# Patient Record
Sex: FEMALE | Race: WHITE | Employment: FULL TIME | URBAN - METROPOLITAN AREA
[De-identification: names, ages, dates, MRNs, and addresses within clinical notes are randomized per-mention and may not be internally consistent; named-entity substitution may affect disease eponyms.]

---

## 2019-09-24 PROBLEM — Z34.90 PREGNANCY: Status: ACTIVE | Noted: 2019-09-24

## 2020-04-14 ENCOUNTER — HOSPITAL ENCOUNTER (INPATIENT)
Age: 30
LOS: 2 days | Discharge: HOME | End: 2020-04-17
Attending: OBSTETRICS & GYNECOLOGY | Admitting: OBSTETRICS & GYNECOLOGY
Payer: COMMERCIAL

## 2020-04-14 DIAGNOSIS — Z37.9 NORMAL LABOR: Primary | ICD-10-CM

## 2020-04-15 ENCOUNTER — ANESTHESIA EVENT (OUTPATIENT)
Dept: LABOR AND DELIVERY | Age: 30
End: 2020-04-15
Payer: COMMERCIAL

## 2020-04-15 ENCOUNTER — ANESTHESIA (OUTPATIENT)
Dept: LABOR AND DELIVERY | Age: 30
End: 2020-04-15
Payer: COMMERCIAL

## 2020-04-15 PROBLEM — Z37.9 NORMAL LABOR: Status: ACTIVE | Noted: 2020-04-15

## 2020-04-15 LAB
ABO + RH BLD: NORMAL
ALBUMIN SERPL-MCNC: 2.7 G/DL (ref 3.5–5)
ALBUMIN/GLOB SERPL: 0.7 {RATIO} (ref 1.2–3.5)
ALP SERPL-CCNC: 146 U/L (ref 50–136)
ALT SERPL-CCNC: 14 U/L (ref 12–65)
ANION GAP SERPL CALC-SCNC: 11 MMOL/L (ref 7–16)
ARTERIAL PATENCY WRIST A: ABNORMAL
ARTERIAL PATENCY WRIST A: ABNORMAL
AST SERPL-CCNC: 20 U/L (ref 15–37)
BASE DEFICIT BLD-SCNC: 5 MMOL/L
BASOPHILS # BLD: 0 K/UL (ref 0–0.2)
BASOPHILS NFR BLD: 0 % (ref 0–2)
BDY SITE: ABNORMAL
BDY SITE: ABNORMAL
BILIRUB SERPL-MCNC: 0.1 MG/DL (ref 0.2–1.1)
BLOOD GROUP ANTIBODIES SERPL: NORMAL
BUN SERPL-MCNC: 7 MG/DL (ref 6–23)
CA-I BLD-MCNC: 1.54 MMOL/L (ref 1.12–1.32)
CALCIUM SERPL-MCNC: 8.9 MG/DL (ref 8.3–10.4)
CHLORIDE SERPL-SCNC: 107 MMOL/L (ref 98–107)
CO2 SERPL-SCNC: 20 MMOL/L (ref 21–32)
COLLECT TIME,HTIME: 1335
COLLECT TIME,HTIME: 1335
CREAT SERPL-MCNC: 0.63 MG/DL (ref 0.6–1)
DIFFERENTIAL METHOD BLD: ABNORMAL
EOSINOPHIL # BLD: 0 K/UL (ref 0–0.8)
EOSINOPHIL NFR BLD: 0 % (ref 0.5–7.8)
ERYTHROCYTE [DISTWIDTH] IN BLOOD BY AUTOMATED COUNT: 12.8 % (ref 11.9–14.6)
GAS FLOW.O2 O2 DELIVERY SYS: ABNORMAL L/MIN
GAS FLOW.O2 O2 DELIVERY SYS: ABNORMAL L/MIN
GLOBULIN SER CALC-MCNC: 4 G/DL (ref 2.3–3.5)
GLUCOSE BLD STRIP.AUTO-MCNC: 76 MG/DL (ref 65–100)
GLUCOSE BLD STRIP.AUTO-MCNC: 93 MG/DL (ref 65–100)
GLUCOSE SERPL-MCNC: 93 MG/DL (ref 65–100)
HCO3 BLD-SCNC: 23.2 MMOL/L (ref 22–26)
HCO3 BLDV-SCNC: 21 MMOL/L (ref 23–28)
HCT VFR BLD AUTO: 36.6 % (ref 35.8–46.3)
HGB BLD-MCNC: 12.5 G/DL (ref 11.7–15.4)
IMM GRANULOCYTES # BLD AUTO: 0 K/UL (ref 0–0.5)
IMM GRANULOCYTES NFR BLD AUTO: 0 % (ref 0–5)
LYMPHOCYTES # BLD: 2.6 K/UL (ref 0.5–4.6)
LYMPHOCYTES NFR BLD: 24 % (ref 13–44)
MCH RBC QN AUTO: 30.3 PG (ref 26.1–32.9)
MCHC RBC AUTO-ENTMCNC: 34.2 G/DL (ref 31.4–35)
MCV RBC AUTO: 88.8 FL (ref 79.6–97.8)
MONOCYTES # BLD: 1 K/UL (ref 0.1–1.3)
MONOCYTES NFR BLD: 9 % (ref 4–12)
NEUTS SEG # BLD: 7.3 K/UL (ref 1.7–8.2)
NEUTS SEG NFR BLD: 67 % (ref 43–78)
NRBC # BLD: 0 K/UL (ref 0–0.2)
PCO2 BLD: 54.7 MMHG (ref 35–45)
PCO2 BLDV: 39.6 MMHG (ref 41–51)
PH BLD: 7.24 [PH] (ref 7.35–7.45)
PH BLDV: 7.33 [PH] (ref 7.32–7.42)
PLATELET # BLD AUTO: 229 K/UL (ref 150–450)
PMV BLD AUTO: 12.4 FL (ref 9.4–12.3)
PO2 BLD: 21 MMHG (ref 75–100)
PO2 BLDV: 27 MMHG
POTASSIUM BLD-SCNC: 4.8 MMOL/L (ref 3.5–5.1)
POTASSIUM SERPL-SCNC: 3.7 MMOL/L (ref 3.5–5.1)
PROT SERPL-MCNC: 6.7 G/DL (ref 6.3–8.2)
RBC # BLD AUTO: 4.12 M/UL (ref 4.05–5.2)
SAO2 % BLD: 26 % (ref 95–98)
SAO2 % BLDV: 47 % (ref 65–88)
SERVICE CMNT-IMP: ABNORMAL
SERVICE CMNT-IMP: ABNORMAL
SODIUM BLD-SCNC: 135 MMOL/L (ref 136–145)
SODIUM SERPL-SCNC: 138 MMOL/L (ref 136–145)
SPECIMEN EXP DATE BLD: NORMAL
SPECIMEN TYPE: ABNORMAL
SPECIMEN TYPE: ABNORMAL
WBC # BLD AUTO: 10.9 K/UL (ref 4.3–11.1)

## 2020-04-15 PROCEDURE — 74011250637 HC RX REV CODE- 250/637: Performed by: OBSTETRICS & GYNECOLOGY

## 2020-04-15 PROCEDURE — 82947 ASSAY GLUCOSE BLOOD QUANT: CPT

## 2020-04-15 PROCEDURE — 59025 FETAL NON-STRESS TEST: CPT

## 2020-04-15 PROCEDURE — 4A1HXCZ MONITORING OF PRODUCTS OF CONCEPTION, CARDIAC RATE, EXTERNAL APPROACH: ICD-10-PCS | Performed by: OBSTETRICS & GYNECOLOGY

## 2020-04-15 PROCEDURE — 77030019905 HC CATH URETH INTMIT MDII -A

## 2020-04-15 PROCEDURE — 0KQM0ZZ REPAIR PERINEUM MUSCLE, OPEN APPROACH: ICD-10-PCS | Performed by: OBSTETRICS & GYNECOLOGY

## 2020-04-15 PROCEDURE — 82803 BLOOD GASES ANY COMBINATION: CPT

## 2020-04-15 PROCEDURE — 77030018846 HC SOL IRR STRL H20 ICUM -A: Performed by: OBSTETRICS & GYNECOLOGY

## 2020-04-15 PROCEDURE — 74011250636 HC RX REV CODE- 250/636: Performed by: ANESTHESIOLOGY

## 2020-04-15 PROCEDURE — 76060000078 HC EPIDURAL ANESTHESIA

## 2020-04-15 PROCEDURE — 74011000250 HC RX REV CODE- 250: Performed by: ANESTHESIOLOGY

## 2020-04-15 PROCEDURE — 36415 COLL VENOUS BLD VENIPUNCTURE: CPT

## 2020-04-15 PROCEDURE — 77030003028 HC SUT VCRL J&J -A: Performed by: OBSTETRICS & GYNECOLOGY

## 2020-04-15 PROCEDURE — 77030002888 HC SUT CHRMC J&J -A: Performed by: OBSTETRICS & GYNECOLOGY

## 2020-04-15 PROCEDURE — 75410000002 HC LABOR FEE PER 1 HR

## 2020-04-15 PROCEDURE — 65270000029 HC RM PRIVATE

## 2020-04-15 PROCEDURE — 00HU33Z INSERTION OF INFUSION DEVICE INTO SPINAL CANAL, PERCUTANEOUS APPROACH: ICD-10-PCS | Performed by: ANESTHESIOLOGY

## 2020-04-15 PROCEDURE — 80053 COMPREHEN METABOLIC PANEL: CPT

## 2020-04-15 PROCEDURE — A4300 CATH IMPL VASC ACCESS PORTAL: HCPCS | Performed by: ANESTHESIOLOGY

## 2020-04-15 PROCEDURE — 77030014125 HC TY EPDRL BBMI -B: Performed by: ANESTHESIOLOGY

## 2020-04-15 PROCEDURE — 74011250636 HC RX REV CODE- 250/636: Performed by: NURSE ANESTHETIST, CERTIFIED REGISTERED

## 2020-04-15 PROCEDURE — 86900 BLOOD TYPING SEROLOGIC ABO: CPT

## 2020-04-15 PROCEDURE — 74011250636 HC RX REV CODE- 250/636: Performed by: OBSTETRICS & GYNECOLOGY

## 2020-04-15 PROCEDURE — 77030011945 HC CATH URIN INT ST MENT -A: Performed by: OBSTETRICS & GYNECOLOGY

## 2020-04-15 PROCEDURE — 85025 COMPLETE CBC W/AUTO DIFF WBC: CPT

## 2020-04-15 PROCEDURE — 75410000003 HC RECOV DEL/VAG/CSECN EA 0.5 HR

## 2020-04-15 PROCEDURE — 75410000000 HC DELIVERY VAGINAL/SINGLE

## 2020-04-15 PROCEDURE — 99283 EMERGENCY DEPT VISIT LOW MDM: CPT

## 2020-04-15 RX ORDER — ONDANSETRON 2 MG/ML
4 INJECTION INTRAMUSCULAR; INTRAVENOUS
Status: DISCONTINUED | OUTPATIENT
Start: 2020-04-15 | End: 2020-04-15 | Stop reason: HOSPADM

## 2020-04-15 RX ORDER — DIPHENHYDRAMINE HCL 25 MG
25 CAPSULE ORAL
Status: DISCONTINUED | OUTPATIENT
Start: 2020-04-15 | End: 2020-04-17 | Stop reason: HOSPADM

## 2020-04-15 RX ORDER — CALCIUM CARBONATE 200(500)MG
200 TABLET,CHEWABLE ORAL AS NEEDED
Status: DISCONTINUED | OUTPATIENT
Start: 2020-04-15 | End: 2020-04-17 | Stop reason: HOSPADM

## 2020-04-15 RX ORDER — MINERAL OIL
120 OIL (ML) ORAL
Status: COMPLETED | OUTPATIENT
Start: 2020-04-15 | End: 2020-04-15

## 2020-04-15 RX ORDER — ROPIVACAINE HYDROCHLORIDE 2 MG/ML
INJECTION, SOLUTION EPIDURAL; INFILTRATION; PERINEURAL
Status: DISCONTINUED | OUTPATIENT
Start: 2020-04-15 | End: 2020-04-15 | Stop reason: HOSPADM

## 2020-04-15 RX ORDER — SODIUM CHLORIDE 0.9 % (FLUSH) 0.9 %
5-40 SYRINGE (ML) INJECTION EVERY 8 HOURS
Status: DISCONTINUED | OUTPATIENT
Start: 2020-04-15 | End: 2020-04-16

## 2020-04-15 RX ORDER — IBUPROFEN 800 MG/1
800 TABLET ORAL
Status: DISCONTINUED | OUTPATIENT
Start: 2020-04-15 | End: 2020-04-17 | Stop reason: HOSPADM

## 2020-04-15 RX ORDER — NALOXONE HYDROCHLORIDE 0.4 MG/ML
0.4 INJECTION, SOLUTION INTRAMUSCULAR; INTRAVENOUS; SUBCUTANEOUS AS NEEDED
Status: DISCONTINUED | OUTPATIENT
Start: 2020-04-15 | End: 2020-04-17 | Stop reason: HOSPADM

## 2020-04-15 RX ORDER — OXYTOCIN/RINGER'S LACTATE 30/500 ML
250 PLASTIC BAG, INJECTION (ML) INTRAVENOUS ONCE
Status: DISPENSED | OUTPATIENT
Start: 2020-04-15 | End: 2020-04-15

## 2020-04-15 RX ORDER — LIDOCAINE HYDROCHLORIDE 20 MG/ML
JELLY TOPICAL
Status: DISCONTINUED | OUTPATIENT
Start: 2020-04-15 | End: 2020-04-15 | Stop reason: HOSPADM

## 2020-04-15 RX ORDER — BUTORPHANOL TARTRATE 2 MG/ML
1 INJECTION INTRAMUSCULAR; INTRAVENOUS
Status: DISCONTINUED | OUTPATIENT
Start: 2020-04-15 | End: 2020-04-15 | Stop reason: HOSPADM

## 2020-04-15 RX ORDER — SODIUM CHLORIDE 0.9 % (FLUSH) 0.9 %
5-40 SYRINGE (ML) INJECTION AS NEEDED
Status: DISCONTINUED | OUTPATIENT
Start: 2020-04-15 | End: 2020-04-17 | Stop reason: HOSPADM

## 2020-04-15 RX ORDER — LIDOCAINE HYDROCHLORIDE AND EPINEPHRINE 15; 5 MG/ML; UG/ML
INJECTION, SOLUTION EPIDURAL
Status: COMPLETED | OUTPATIENT
Start: 2020-04-15 | End: 2020-04-15

## 2020-04-15 RX ORDER — OXYTOCIN/RINGER'S LACTATE 30/500 ML
0-20 PLASTIC BAG, INJECTION (ML) INTRAVENOUS
Status: DISCONTINUED | OUTPATIENT
Start: 2020-04-15 | End: 2020-04-16

## 2020-04-15 RX ORDER — LIDOCAINE HYDROCHLORIDE 10 MG/ML
1 INJECTION INFILTRATION; PERINEURAL
Status: DISCONTINUED | OUTPATIENT
Start: 2020-04-15 | End: 2020-04-15 | Stop reason: HOSPADM

## 2020-04-15 RX ORDER — ZOLPIDEM TARTRATE 5 MG/1
5 TABLET ORAL
Status: DISCONTINUED | OUTPATIENT
Start: 2020-04-15 | End: 2020-04-17 | Stop reason: HOSPADM

## 2020-04-15 RX ORDER — OXYCODONE AND ACETAMINOPHEN 7.5; 325 MG/1; MG/1
1 TABLET ORAL
Status: DISCONTINUED | OUTPATIENT
Start: 2020-04-15 | End: 2020-04-17 | Stop reason: HOSPADM

## 2020-04-15 RX ORDER — DEXTROSE, SODIUM CHLORIDE, SODIUM LACTATE, POTASSIUM CHLORIDE, AND CALCIUM CHLORIDE 5; .6; .31; .03; .02 G/100ML; G/100ML; G/100ML; G/100ML; G/100ML
125 INJECTION, SOLUTION INTRAVENOUS CONTINUOUS
Status: DISCONTINUED | OUTPATIENT
Start: 2020-04-15 | End: 2020-04-16

## 2020-04-15 RX ORDER — SIMETHICONE 80 MG
80 TABLET,CHEWABLE ORAL
Status: DISCONTINUED | OUTPATIENT
Start: 2020-04-15 | End: 2020-04-17 | Stop reason: HOSPADM

## 2020-04-15 RX ADMIN — SODIUM CHLORIDE, SODIUM LACTATE, POTASSIUM CHLORIDE, AND CALCIUM CHLORIDE 1000 ML: 600; 310; 30; 20 INJECTION, SOLUTION INTRAVENOUS at 07:00

## 2020-04-15 RX ADMIN — IBUPROFEN 800 MG: 800 TABLET, FILM COATED ORAL at 15:40

## 2020-04-15 RX ADMIN — BUTORPHANOL TARTRATE 1 MG: 2 INJECTION, SOLUTION INTRAMUSCULAR; INTRAVENOUS at 04:58

## 2020-04-15 RX ADMIN — IBUPROFEN 800 MG: 800 TABLET, FILM COATED ORAL at 22:09

## 2020-04-15 RX ADMIN — SODIUM CHLORIDE, SODIUM LACTATE, POTASSIUM CHLORIDE, CALCIUM CHLORIDE, AND DEXTROSE MONOHYDRATE 125 ML/HR: 600; 310; 30; 20; 5 INJECTION, SOLUTION INTRAVENOUS at 04:58

## 2020-04-15 RX ADMIN — LIDOCAINE HYDROCHLORIDE,EPINEPHRINE BITARTRATE 3 ML: 15; .005 INJECTION, SOLUTION EPIDURAL; INFILTRATION; INTRACAUDAL; PERINEURAL at 07:25

## 2020-04-15 RX ADMIN — Medication 2 MILLI-UNITS/MIN: at 06:25

## 2020-04-15 RX ADMIN — OXYCODONE HYDROCHLORIDE AND ACETAMINOPHEN 1 TABLET: 7.5; 325 TABLET ORAL at 16:32

## 2020-04-15 RX ADMIN — MINERAL OIL 120 ML: 471.95 OIL ORAL at 13:35

## 2020-04-15 RX ADMIN — OXYCODONE HYDROCHLORIDE AND ACETAMINOPHEN 1 TABLET: 7.5; 325 TABLET ORAL at 20:42

## 2020-04-15 RX ADMIN — ONDANSETRON 4 MG: 2 INJECTION INTRAMUSCULAR; INTRAVENOUS at 12:28

## 2020-04-15 RX ADMIN — ROPIVACAINE HYDROCHLORIDE 8 ML/HR: 2 INJECTION, SOLUTION EPIDURAL; INFILTRATION at 07:31

## 2020-04-15 RX ADMIN — ANTACID TABLETS 200 MG: 500 TABLET, CHEWABLE ORAL at 04:57

## 2020-04-15 RX ADMIN — ANTACID TABLETS 200 MG: 500 TABLET, CHEWABLE ORAL at 11:23

## 2020-04-15 NOTE — ANESTHESIA PREPROCEDURE EVALUATION
Relevant Problems   No relevant active problems       Anesthetic History   No history of anesthetic complications            Review of Systems / Medical History  Patient summary reviewed and pertinent labs reviewed    Pulmonary  Within defined limits                 Neuro/Psych   Within defined limits           Cardiovascular  Within defined limits                     GI/Hepatic/Renal  Within defined limits              Endo/Other  Within defined limits           Other Findings              Physical Exam    Airway  Mallampati: II  TM Distance: 4 - 6 cm  Neck ROM: normal range of motion   Mouth opening: Normal     Cardiovascular    Rhythm: regular  Rate: normal         Dental  No notable dental hx       Pulmonary  Breath sounds clear to auscultation               Abdominal         Other Findings            Anesthetic Plan    ASA: 2  Anesthesia type: epidural            Anesthetic plan and risks discussed with: Patient

## 2020-04-15 NOTE — PROGRESS NOTES
Patient up to bathroom with assistance x 2. Patient unable to void. Pt states that she feels faint. Pt assisted x 2 back to bed. Straight Cath completed for output of 1250 cc straw colored urine. Patient verbalizes understanding to call for assistance to bathroom, needs or concerns. Verbalizes understanding.

## 2020-04-15 NOTE — PROGRESS NOTES
Pt called for assistance going to bathroom. Pt up and ambulated to bathroom. Pt unable to void. Scant blood noted in toilet. Marie pad changed. Ice pack applied to perineum. Pt back in bed with call light in reach. No complaints with ambulation. Primary RN made aware.

## 2020-04-15 NOTE — PROGRESS NOTES
Pt admitted to room 430 for SROM. Pt placed on EFM, IV started, and consents witnessed. Admission education completed and all questions answered at this time.

## 2020-04-15 NOTE — PROGRESS NOTES
Safety Teaching reviewed:   1. Hand hygiene prior to handling the infant. 2. Use of bulb syringe  3. Bracelets with matching numbers are placed on mother and infant  3. An infant security tag  [facility]) is placed on the infant's ankle and monitored  5. All OB nurses wear pink Employee badges - do not give your baby to anyone without proper identification. 6. Never leave the baby alone in the room. 7. The infant should be placed on their back to sleep. on a firm mattress. No toys should be placed in the crib. (safe sleep video offered to view)  8. Never shake the baby (video offered to view)  9. Infant fall prevention - do not sleep with the baby, and place the baby in the crib while ambulating. 8. Mother and Baby Care booklet given to Mother.

## 2020-04-15 NOTE — ANESTHESIA PROCEDURE NOTES
Epidural Block    Start time: 4/15/2020 7:10 AM  End time: 4/15/2020 7:30 AM  Performed by: Nicholas Brito MD  Authorized by: Nicholas Brito MD     Pre-Procedure  Indication: at surgeon's request and labor epidural    Preanesthetic Checklist: patient identified, risks and benefits discussed, anesthesia consent, site marked, patient being monitored, timeout performed and anesthesia consent    Timeout Time: 07:12        Epidural:   Patient position:  Seated  Prep region:  Lumbar  Prep: Chlorhexidine    Location:  L3-4    Needle and Epidural Catheter:   Needle Type:  Tuohy  Needle Gauge:  17 G  Injection Technique:  Loss of resistance using saline  Attempts:  2  Catheter Size:  19 G  Catheter at Skin Depth (cm):  9  Events: no blood with aspiration, no cerebrospinal fluid with aspiration, no paresthesia and negative aspiration test    Test Dose:  Negative    Assessment:   Catheter Secured:  Tegaderm  Insertion:  Uncomplicated  Patient tolerance:  Patient tolerated the procedure well with no immediate complications

## 2020-04-15 NOTE — PROGRESS NOTES
Orders received from Dr. Nika Bose to begin oxytocin at 0600, starting at 2 milliunits/min and going up by 2 q3-mins with a max dose of 20.

## 2020-04-15 NOTE — H&P
History & Physical    Name: Kari Rayo MRN: [id number]  SSN: [id number]    YOB: 1990  Age: 26 y.o. Sex: female        Subjective: Pt is 25 y/o  at 42w2d who presents with SROM 1-2 hours ago. Pt also notes mild uterine ctx. PT notes good FM. She denies VB, UTI or PEC symptoms. She denies cough, fevers, SOB, recent travel, or exposure to the COVID-19 virus. Estimated Date of Delivery: 20  OB History    Para Term  AB Living   1 0 0 0 0 0   SAB TAB Ectopic Molar Multiple Live Births   0 0 0   0        # Outcome Date GA Lbr Javier/2nd Weight Sex Delivery Anes PTL Lv   1 Current                 Please see prenatal records for details. Past Medical History:   Diagnosis Date    Mononucleosis      Past Surgical History:   Procedure Laterality Date    HX WISDOM TEETH EXTRACTION       Social History     Occupational History    Not on file   Tobacco Use    Smoking status: Never Smoker    Smokeless tobacco: Never Used   Substance and Sexual Activity    Alcohol use: Not Currently     Comment: none since +pt    Drug use: No    Sexual activity: Yes     Partners: Male     Birth control/protection: None     Family History   Problem Relation Age of Onset    High Cholesterol Father     Hypertension Father     No Known Problems Mother     Breast Cancer Maternal Grandmother 64    Thyroid Disease Sister     No Known Problems Maternal Grandfather     No Known Problems Paternal Grandmother     Diabetes Paternal Grandfather     Stroke Paternal Grandfather        No Known Allergies  Prior to Admission medications    Medication Sig Start Date End Date Taking? Authorizing Provider   fexofenadine-pseudoephedrine (Allegra-D 12 Hour)  mg Tb12 Take 1 Tab by mouth every twelve (12) hours. Yes Provider, Historical   prenatal 47/iron/folate 1/dha (PNV-DHA PO) Take  by mouth. Yes Provider, Historical   loratadine (Claritin) 10 mg tablet Take 10 mg by mouth daily.  19 20  Provider, Historical        Review of Systems:  Constitutional:No headache, fever  Cardiac:   No chest pain      Resp: No cough or shortness of breath     GI:   No nausea/vomiting, diarrhea  :   No dysuria  Neuro:     No vision changes, headache      Objective:     Vitals:  Vitals:    20 2344   Resp: 18   Temp: 98.2 °F (36.8 °C)        Physical Exam:  Patient without distress. Heart: Regular rate and rhythm  Lung: clear to auscultation throughout lung fields, no wheezes, no rales, no rhonchi and normal respiratory effort  Back: costovertebral angle tenderness absent  Abdomen: soft, nontender  Fundus: soft and non tender  Cervical Exam: 1 cm dilated    60% effaced    -2 station    Presenting Part: cephalic  Lower Extremities:  - Edema No  Membranes:  Spontaneous Rupture of Membranes; Amniotic Fluid: clear fluid  Fetal Heart Rate: Baseline: 135 per minute  Variability: moderate  Accelerations: yes  Decelerations: none  Uterine contractions: irregular    Prenatal Labs:   Lab Results   Component Value Date/Time    Rubella, External immune 2019    HBsAg, External negative 2019    HIV, External NR 2019    RPR, External NR 2019         Assessment/Plan:     Ms. Benjamin English is a  seen with pregnancy at 37w2d for Presumptive ROM . GBS is negative. Plan:     Admit for labor management    Patient discussed with Dr. Chelita Vera.

## 2020-04-15 NOTE — ROUTINE PROCESS
TRANSFER - IN REPORT: 
 
Verbal report received from [facility] RN(name) on Leonarda Newton  being received from L&D(unit) for routine progression of care Report consisted of patients Situation, Background, Assessment and  
Recommendations(SBAR). Information from the following report(s) SBAR and Kardex was reviewed with the receiving nurse. Opportunity for questions and clarification was provided. Assessment completed upon patients arrival to unit and care assumed.

## 2020-04-15 NOTE — PROGRESS NOTES
0700 Bedside and Verbal shift change report given to BEBETO Limon RN (oncoming nurse) by ARPIT Thakkar RN (offgoing nurse). Report included the following information SBAR. Pt. Repositioned to sitting up for epidural placement. 26 Dr. West Ordonez at bedside for epidural placement. EFM tracing maternal HR while pt. Sitting up in bed.    0724 Epidural catheter in place, test dose given, see anesthesia records, serial BP's begin. 3251 Sterile straight cath per RN. No urine drained. SVE per BEBETO Limon RN 5/100/0. Pt. Repositioned to right side with peanut ball. 0681 Dr. Miriam Jean at bedside for maternal and fetal assessment. FHR reviewed per Dr. Miriam Jean. No new orders received. 1020 Sterile straight cath per RN. 250 cc clear yellow urine drained. SVE per RN 7/100/0. Pt. Repositioned to left side with peanut ball. 1124 Pt. Complaining of heart burn. Tums given. See MAR. Pt. Repositioned to high simmons's  1221 Sterile straight cath per  cc clear yellow urine drained. SVE per RN. 9/100/+1. Pt. Repositioned to right side with peanut ball  1228 Pt. C/o nausea. Zofran given. See MAR  1307 SVE per RN 10/100/+2. RN remains at bedside continuously assessing maternal and fetal status while pushing. 1335 Delivery of viable girl. See delivery summary. 1532 Pt. Up to bathroom to void. Pt. Feels nausea and light headed. Pt. Returned to bed. Pt. Cleaned up. Pt. Encouraged to eat before transfer.

## 2020-04-15 NOTE — PROCEDURES
Delivery Note    Patient Name: Leonarda Newton  MRN: [id number]    Obstetrician:  Eleanor Nguyen MD    Assistant: none    Pre-Delivery Diagnosis: Term pregnancy, Spontaneous labor, Single fetus and Uncomplicated pregnancy    Post-Delivery Diagnosis: Female    Intrapartum Event: None    Procedure: Spontaneous vaginal delivery    Epidural: YES    Monitor:  Fetal Heart Tones - External and Uterine Contractions - External    Indications for instrumental delivery: none    Estimated Blood Loss: 600    Episiotomy: none    Laceration(s):  2nd degree  (large, irregular) and vaginal sulcus    Laceration(s) repair: YES    Presentation: Cephalic    Fetal Description: bhagat    Fetal Position: Occiput Posterior    Birth Weight: 6-5    Birth Length: 51    Apgar - One Minute: 8    Apgar - Five Minutes: 9    Umbilical Cord: Nuchal Cord x  1, 3 vessels present and Cord blood sent to lab for type, Rh, and Maxwell' test    Specimens: no           Complications:  none           No components found for: OBEXTABO/RH,  OBEXTANTIBODY,  OBEXTRUBELLA,  OBEXTGRBS         Attending Attestation: I was present and scrubbed for the entire procedure

## 2020-04-16 PROCEDURE — 65270000029 HC RM PRIVATE

## 2020-04-16 PROCEDURE — 74011250637 HC RX REV CODE- 250/637: Performed by: OBSTETRICS & GYNECOLOGY

## 2020-04-16 RX ORDER — DOCUSATE SODIUM 100 MG/1
100 CAPSULE, LIQUID FILLED ORAL 2 TIMES DAILY
Status: DISCONTINUED | OUTPATIENT
Start: 2020-04-16 | End: 2020-04-17 | Stop reason: HOSPADM

## 2020-04-16 RX ADMIN — IBUPROFEN 800 MG: 800 TABLET, FILM COATED ORAL at 04:18

## 2020-04-16 RX ADMIN — IBUPROFEN 800 MG: 800 TABLET, FILM COATED ORAL at 17:37

## 2020-04-16 RX ADMIN — OXYCODONE HYDROCHLORIDE AND ACETAMINOPHEN 1 TABLET: 7.5; 325 TABLET ORAL at 01:47

## 2020-04-16 RX ADMIN — OXYCODONE HYDROCHLORIDE AND ACETAMINOPHEN 1 TABLET: 7.5; 325 TABLET ORAL at 21:28

## 2020-04-16 RX ADMIN — IBUPROFEN 800 MG: 800 TABLET, FILM COATED ORAL at 23:40

## 2020-04-16 RX ADMIN — OXYCODONE HYDROCHLORIDE AND ACETAMINOPHEN 1 TABLET: 7.5; 325 TABLET ORAL at 05:57

## 2020-04-16 RX ADMIN — IBUPROFEN 800 MG: 800 TABLET, FILM COATED ORAL at 10:52

## 2020-04-16 RX ADMIN — OXYCODONE HYDROCHLORIDE AND ACETAMINOPHEN 1 TABLET: 7.5; 325 TABLET ORAL at 10:52

## 2020-04-16 RX ADMIN — DOCUSATE SODIUM 100 MG: 100 CAPSULE, LIQUID FILLED ORAL at 17:37

## 2020-04-16 RX ADMIN — OXYCODONE HYDROCHLORIDE AND ACETAMINOPHEN 1 TABLET: 7.5; 325 TABLET ORAL at 17:37

## 2020-04-16 NOTE — PROGRESS NOTES
Post-Delivery Day Number 1 Progress Note    Patient doing well post-delivery day 1 from vaginal delivery without significant complaints. Pain controlled on current medication. Tolerating diet. Ambulating/Voiding without difficulty, normal lochia. Vitals:  Blood pressure 110/61, pulse 77, temperature 98.3 °F (36.8 °C), resp. rate 17, last menstrual period 07/29/2019, SpO2 98 %, currently breastfeeding. Vital signs stable, afebrile. Exam:  Patient without distress. Abdomen soft, fundus firm at level of umbilicus, nontender. Lower extremities are negative for swelling, cords or tenderness. Lochia- moderate    Labs: No lab exists for component: HBG    Assessment and Plan:  Patient appears to be having uncomplicated post-vaginal delivery course. Continue routine post-op care and maternal education.

## 2020-04-16 NOTE — PROGRESS NOTES
Chart reviewed - first time parent. Phone call placed into patient's room due to social distancing. Introduction made as hospital ; patient agreeable to continue conversation.  provided education on [facility] [employer] [facility] [place] [facility].  Family declined referral for home visit. Family denies any additional needs from  at this time. Family has 's contact information should any needs/questions arise. RN given informational packet on  mood & anxiety disorders (resources/education) to provide to patient.       BETH Way  [facility]   [phone]

## 2020-04-16 NOTE — LACTATION NOTE
In to see mom and infant for the first time. Mom stated that infant has been latching and nursing but that he is sleepy now and not wanting to wake up. Reviewed with mom and dad the expectations of the first 24 hours as well as the second night of life. Informed mom to call out when infant awake and cueing.

## 2020-04-16 NOTE — LACTATION NOTE

## 2020-04-16 NOTE — ANESTHESIA POSTPROCEDURE EVALUATION
* No procedures listed *.    epidural    Anesthesia Post Evaluation      Multimodal analgesia: multimodal analgesia used between 6 hours prior to anesthesia start to PACU discharge  Patient location during evaluation: floor  Patient participation: complete - patient participated  Level of consciousness: awake and alert  Pain management: adequate  Airway patency: patent  Anesthetic complications: no  Cardiovascular status: acceptable  Respiratory status: acceptable  Hydration status: acceptable  Post anesthesia nausea and vomiting:  controlled      Visit Vitals  /76 (BP 1 Location: Right arm, BP Patient Position: At rest)   Pulse 87   Temp 36.8 °C (98.2 °F)   Resp 17   SpO2 99%   Breastfeeding Yes

## 2020-04-16 NOTE — LACTATION NOTE
Called to room to assist with a latch. Infant very sleepy. Mom placed infant to her breast and he and took a few sucks and fell back to sleep. Was able to position infant to get a deeper latch and mom stated that it felt much better. Informed mom that this normal and to continue to offer breast at least every 3 hours and expect him to cluster feed tonight. Lactation consultant will follow up tomorrow.

## 2020-04-17 VITALS
HEART RATE: 88 BPM | SYSTOLIC BLOOD PRESSURE: 121 MMHG | OXYGEN SATURATION: 97 % | DIASTOLIC BLOOD PRESSURE: 73 MMHG | RESPIRATION RATE: 17 BRPM | TEMPERATURE: 98.1 F

## 2020-04-17 PROCEDURE — 74011250637 HC RX REV CODE- 250/637: Performed by: OBSTETRICS & GYNECOLOGY

## 2020-04-17 RX ORDER — OXYCODONE AND ACETAMINOPHEN 5; 325 MG/1; MG/1
1 TABLET ORAL
Qty: 12 TAB | Refills: 0 | Status: SHIPPED | OUTPATIENT
Start: 2020-04-17 | End: 2020-04-20

## 2020-04-17 RX ORDER — IBUPROFEN 800 MG/1
800 TABLET ORAL
Qty: 40 TAB | Refills: 0 | Status: SHIPPED | OUTPATIENT
Start: 2020-04-17

## 2020-04-17 RX ADMIN — DOCUSATE SODIUM 100 MG: 100 CAPSULE, LIQUID FILLED ORAL at 09:23

## 2020-04-17 RX ADMIN — OXYCODONE HYDROCHLORIDE AND ACETAMINOPHEN 1 TABLET: 7.5; 325 TABLET ORAL at 01:53

## 2020-04-17 RX ADMIN — IBUPROFEN 800 MG: 800 TABLET, FILM COATED ORAL at 06:05

## 2020-04-17 RX ADMIN — OXYCODONE HYDROCHLORIDE AND ACETAMINOPHEN 1 TABLET: 7.5; 325 TABLET ORAL at 09:23

## 2020-04-17 NOTE — DISCHARGE INSTRUCTIONS
Patient Education   Discharge instruction to follow: Activity: Pelvis rest for 6 weeks     No heavy lifting over 15 lbs for 2 weeks     No driving for 2 weeks     No push/pull motion such as sweeping or vacuuming for 2 weeks     No tub baths for 6 weeks    Continue using the hygenique wand after each void or bowel movement. If using sitz bath continue until comfortable stopping. If using shiela-bottle continue to use until comfortable stopping. Change sanitary pad after each urination or bowel movement. Call MD for the following:      Fever over 101 F; pain not relieved by medication; foul smelling vaginal discharge or an increase in vaginal bleeding. Take medication as prescribed. Follow up with MD as order. Vaginal Childbirth: Care Instructions  Your Care Instructions    Your body will slowly heal in the next few weeks. It is easy to get too tired and overwhelmed during the first weeks after your baby is born. Changes in your hormones can shift your mood without warning. You may find it hard to meet the extra demands on your energy and time. Take it easy on yourself. Follow-up care is a key part of your treatment and safety. Be sure to make and go to all appointments, and call your doctor if you are having problems. It's also a good idea to know your test results and keep a list of the medicines you take. How can you care for yourself at home? · Vaginal bleeding and cramps  ? After delivery, you will have a bloody discharge from the vagina. This will turn pink within a week and then white or yellow after about 10 days. It may last for 2 to 4 weeks or longer, until the uterus has healed. Use pads instead of tampons until you stop bleeding. ? Do not worry if you pass some blood clots, as long as they are smaller than a golf ball. If you have a tear or stitches in your vaginal area, change the pad at least every 4 hours to prevent soreness and infection. ?  You may have cramps for the first few days after childbirth. These are normal and occur as the uterus shrinks to normal size. Take an over-the-counter pain medicine, such as acetaminophen (Tylenol), ibuprofen (Advil, Motrin), or naproxen (Aleve), for cramps. Read and follow all instructions on the label. Do not take aspirin, because it can cause more bleeding. ? Do not take two or more pain medicines at the same time unless the doctor told you to. Many pain medicines have acetaminophen, which is Tylenol. Too much acetaminophen (Tylenol) can be harmful. · Stitches  ? If you have stitches, they will dissolve on their own and do not need to be removed. Follow your doctor's instructions for cleaning the stitched area. ? Put ice or a cold pack on your painful area for 10 to 20 minutes at a time, several times a day, for the first few days. Put a thin cloth between the ice and your skin. ? Sit in a few inches of warm water (sitz bath) 3 times a day and after bowel movements. The warm water helps with pain and itching. If you do not have a tub, a warm shower might help. · Breast fullness  ? Your breasts may overfill (engorge) in the first few days after delivery. To help milk flow and to relieve pain, warm your breasts in the shower or by using warm, moist towels before nursing. ? If you are not nursing, do not put warmth on your breasts or touch your breasts. Wear a tight bra or sports bra and use ice until the fullness goes away. This usually takes 2 to 3 days. ? Put ice or a cold pack on your breast after nursing to reduce swelling and pain. Put a thin cloth between the ice and your skin. · Activity  ? Eat a balanced diet. Do not try to lose weight by cutting calories. Keep taking your prenatal vitamins, or take a multivitamin. ? Get as much rest as you can. Try to take naps when your baby sleeps during the day. ? Get some exercise every day. But do not do any heavy exercise until your doctor says it is okay.   ? Wait until you are healed (about 4 to 6 weeks) before you have sexual intercourse. Your doctor will tell you when it is okay to have sex. ? Talk to your doctor about birth control. You can get pregnant even before your period returns. Also, you can get pregnant while you are breastfeeding. · Mental health  ? It is normal to have some sadness, anxiety, sleeplessness, and mood swings after you go home. If you feel upset or hopeless for more than a few days or are having trouble doing the things you need to do, talk to your doctor. · Constipation and hemorrhoids  ? Drink plenty of fluids, enough so that your urine is light yellow or clear like water. If you have kidney, heart, or liver disease and have to limit fluids, talk with your doctor before you increase the amount of fluids you drink. ? Eat plenty of fiber each day. Have a bran muffin or bran cereal for breakfast, and try eating a piece of fruit for a mid-afternoon snack. ? For painful, itchy hemorrhoids, put ice or a cold pack on the area several times a day for 10 minutes at a time. Follow this by putting a warm compress on the area for another 10 to 20 minutes or by sitting in a shallow, warm bath. When should you call for help? Call  911 anytime you think you may need emergency care. For example, call if:    · You have thoughts of harming yourself, your baby, or another person.     · You passed out (lost consciousness).     · You have chest pain, are short of breath, or cough up blood.     · You have a seizure.    Call your doctor now or seek immediate medical care if:    · You have severe vaginal bleeding.     · You are dizzy or lightheaded, or you feel like you may faint.     · You have a fever.     · You have new or more pain in your belly or pelvis.     · You have symptoms of a blood clot in your leg (called a deep vein thrombosis), such as:  ? Pain in the calf, back of the knee, thigh, or groin. ?  Redness and swelling in your leg or groin.     · You have signs of preeclampsia, such as:  ? Sudden swelling of your face, hands, or feet. ? New vision problems (such as dimness, blurring, or seeing spots). ? A severe headache.    Watch closely for changes in your health, and be sure to contact your doctor if:    · Your vaginal bleeding seems to be getting heavier.     · You have new or worse vaginal discharge.     · You feel sad, anxious, or hopeless for more than a few days.     · You do not get better as expected. Where can you learn more? Go to http://tressa-grady.info/  Enter Q237 in the search box to learn more about \"Vaginal Childbirth: Care Instructions. \"  Current as of: May 29, 2019Content Version: 12.4  © 6347-0980 [employer]. Care instructions adapted under license by [employer] (which disclaims liability or warranty for this information). If you have questions about a medical condition or this instruction, always ask your healthcare professional. [address] any warranty or liability for your use of this information.

## 2020-04-17 NOTE — PROGRESS NOTES
Post-Partum Day Number 2 Progress/Discharge Note    Patient doing well post-partum without significant complaint. Voiding without difficulty, normal lochia, positive flatus. Vitals:    Patient Vitals for the past 8 hrs:   BP Temp Pulse Resp SpO2   20 0751 121/73 98.1 °F (36.7 °C) 88 17 97 %     Temp (24hrs), Av.1 °F (36.7 °C), Min:98 °F (36.7 °C), Max:98.1 °F (36.7 °C)      Vital signs stable, afebrile. Exam:  Patient without distress. Abdomen soft, fundus firm at level of umbilicus, non tender               Lower extremities are negative for swelling, cords or tenderness. Lab/Data Review: All lab results for the last 24 hours reviewed. Assessment and Plan:  Patient appears to be having uncomplicated post-partum course. Continue routine perineal care and maternal education. Plan discharge for today with follow up in our office in 2 weeks.

## 2020-04-17 NOTE — LACTATION NOTE
This note was copied from a baby's chart. Individualized Feeding Plan for Breastfeeding   Lactation Services [phone]      As much as possible, hold your baby on your chest so babys bare skin is against your bare skin with a blanket covering babys back, especially 30 minutes before it is time for baby to eat. Watch for early feeding cues such as, licking lips, sucking motions, rooting, hands to mouth. Crying is a late feeding cue. Feed your baby at least 8 times in 24 hours, or more if your baby is showing feeding cues. If baby is sleepy put baby skin to skin and watch for hunger cues. To rouse baby: unwrap, undress, massage hands, feet, & back, change diaper, gently change babys position from lying to sitting. 15-20 minutes on the first breast of active breastfeeding is considered a good feeding. Good, active breastfeeding is when baby is alert, tugging the nipple, their ear may move, and you can hear swallows. Allow baby to finish the first side before changing sides. Sleeping at the breast or only brief, light sucks should not be considered a good, full breastfeed. At each feeding:  __x__1. Do Suck Practice on finger before each feeding until sucking pattern is smooth. Try using index finger. Nail down towards tongue. __x__2. Hand Express for a few minutes prior to latching to help start milk flow. __x__3. Baby needs to NURSE WELL x 15-20 minutes on at least first breast, burp and offer 2nd breast at every feeding. If no sustained latch only attempt at breast for 10 minutes. If baby does not latch on and feed well on at least one side, you should:   __x__4. Double pump for 15 minutes with breast massage and compression. Hand express for an additional 2-3 minutes per side. Pump after each feeding attempt or poor feeding, up to 8 times per day. If you are not putting baby to the breast you need to pump 8 times a day. Pump every 3 hours. __x__5.  Give baby all of the breast milk you obtain using a straight syringe or  curved syringe. If baby does NOT have enough wet and dirty diapers per day, is jaundiced/lethargic, or has significant weight loss AND you do NOT pump enough milk for each feeding (per volume listed below), formula supplementation may need to be used. Call lactation department /pediatrician if you have concerns. AVERAGE INTAKES OF COLOSTRUM BY HEALTHY  INFANTS:  Time  Day Intake (ml per feeding)  Based on 8 feedings per day. 24-48 hrs  2 5-15 ml  48-72 hrs  3 15-30 ml (0.5-1 oz) Based on every 3 hour feedings  72-96 hrs  4 30-45 ml (1-1.5oz)                          5-6       45-60 ml (1.5-2oz)                           7          60-75 ml (2-2.5oz)    By day 7, baby will need 64 ml or 2 oz at each feeding based on 8 feedings per day & babys weight. (1oz = 30ml). Total milk volume needed in 24 hours by Day 7 is 17 oz per day based on baby's birthweight of 6 lbs 6 oz. The more often baby eats, the less volume they need per feeding. If baby is eating more often than the minimum of 8 times per day, they may take less per feeding. Use feeding plan until follow up with pediatrician. Continue to attempt at the breast for most feeds. Pump every 3 hours if no latch. Give all pumped colostrum/breastmilk at each feeding. Mom to follow up with outpatient lactation consultant as needed. Outpatient services are located on the 4th floor at [address] [place] [address]. Check in at the 4th floor registration desk (the same one you used when you came to have your baby).   Call for questions [phone]

## 2020-04-17 NOTE — DISCHARGE SUMMARY
Obstetrical Discharge Summary     Name: Afsaneh Gallardo MRN: [id number]  SSN: [id number]    YOB: 1990  Age: 26 y.o. Sex: female      Allergies: Patient has no known allergies. Admit Date: 2020    Discharge Date: 2020     Admitting Physician: Arsen Virk MD     Attending Physician:  Dave Weinebrg MD     * Admission Diagnoses: Normal labor [O80, Z37.9]    * Discharge Diagnoses:   Information for the patient's :  Kath Prasad [[id number]]   Delivery of a 6 lb 4.9 oz (2.86 kg) female infant via Vaginal, Spontaneous on 4/15/2020 at 1:35 PM  by Jarod Pineda Miranda. Apgars were 8  and 9 .        Additional Diagnoses:   Hospital Problems as of 2020 Date Reviewed: 2020          Codes Class Noted - Resolved POA    Normal labor ICD-10-CM: O80, Z37.9  ICD-9-CM: 312  4/15/2020 - Present Unknown             Lab Results   Component Value Date/Time    ABO/Rh(D) A POSITIVE 04/15/2020 12:20 AM    Rubella, External immune 2019    ABO,Rh A positive 2019      Immunization History   Administered Date(s) Administered    Influenza Vaccine (Mdck Quadrivalent) 2019    Tdap 2020       * Procedures: vaginal delivery  * No surgery found *      [place]  Depression Scale  I have been able to laugh and see the funny side of things: As much as I always could  I have looked forward with enjoyment to things: As much as I ever did  I have blamed myself unnecessarily when things went wrong: Not very often  I have been anxious or worried for no good reason: Hardly ever  I have felt scared or panicky for no very good reason: No, not much  Things have been getting on top of me: No, I have been coping as well as ever  I have been so unhappy that I have had difficulty sleeping: No, not at all  I have felt sad or miserable: No, not at all  I have been so unhappy that I have been crying: No, never  The thought of harming myself has occurred to me: Never  Total Score: 3    * Discharge Condition: good    * Hospital Course: Normal hospital course following the delivery. * Disposition: Home    Discharge Medications:   Current Discharge Medication List      START taking these medications    Details   ibuprofen (MOTRIN) 800 mg tablet Take 1 Tab by mouth every six (6) hours as needed for Pain. Qty: 40 Tab, Refills: 0      oxyCODONE-acetaminophen (Percocet) 5-325 mg per tablet Take 1 Tab by mouth every six (6) hours as needed for Pain for up to 3 days. Max Daily Amount: 4 Tabs. Qty: 12 Tab, Refills: 0    Associated Diagnoses: Normal labor         CONTINUE these medications which have NOT CHANGED    Details   fexofenadine-pseudoephedrine (Allegra-D 12 Hour)  mg Tb12 Take 1 Tab by mouth every twelve (12) hours. prenatal 47/iron/folate 1/dha (PNV-DHA PO) Take  by mouth.      loratadine (Claritin) 10 mg tablet Take 10 mg by mouth daily. * Follow-up Care/Patient Instructions:   Activity: No sex for 6 weeks, No driving while on analgesics and No heavy lifting for 4 weeks  Diet: Regular Diet  Wound Care: Keep wound clean and dry    Follow-up Information     Follow up With Specialties Details Why [facility], [id number] [place] [address],[address], MD [address]  [phone]

## 2020-04-17 NOTE — PROGRESS NOTES
Discharge teaching complete. Mother verbalized understanding, questions encouraged. Arion sheet signed.

## 2020-04-17 NOTE — LACTATION NOTE
In to follow up with mom and infant prior to discharge to home. Mom and dad stated that infant cluster fed during the night but has been sleepy this am. Mom wanted me to assist her with another attempt to feed infant. Mom placed infant to her right breast in the cross cradle hold. Infant would latch and take a few sucks and fall asleep. We attempted several times. Also attempted in football hold. Again infant would latch, take a few sucks and fall asleep. Dad fed infant expressed colostrum with straight syringe. Reviewed how to use the curve tip syringe. Reviewed discharge information and gave a feeding plan and reviewed that as well. Mom plans to continue to use her personal pump and pump every 3 hours when infant does not latch and nurse. Mom stated that she feels that her milk is starting to come in. Mom and infant are following up with outpatient lactation consultant on Tuesday April 21 at 1030.

## 2020-04-21 ENCOUNTER — HOSPITAL ENCOUNTER (OUTPATIENT)
Dept: LACTATION | Age: 30
Discharge: HOME | End: 2020-04-21
Payer: COMMERCIAL

## 2020-04-21 PROCEDURE — 99213 OFFICE O/P EST LOW 20 MIN: CPT

## 2020-04-21 NOTE — LACTATION NOTE
Outpatient Feeding Plan for Breastfeeding  [phone]  Patient: Neli Talbot \"Luda\"  Gestational Age: 37w 2d  Diagnosis:  V 24.1/Z39.1 Difficulty latching. Kassandra Chaudhari. Start Time:  1035  Stop Time:  1135    Good, active breastfeeding is when baby is alert, tugging the nipple in long, deep pulls, and you can hear swallows every 1-2 sucks. By now Mom's milk should be in. Brief, light or shallow sucks or short rapid sucks without frequent swallows should not be considered a full breastfeeding. 1. Complete the following mouth exercises prior to feeding:  Gum Massage and Non-nutritive Sucking    2. Put the baby to the breast every other feeding, about 4 times per day for 15 minutes per side (as long as she is active and interested). Finish first side before offering other side. As nursing is going well, add in additional feedings at the breast.  One additional feeding every few days until fully nursing. Use: Nipple Shield, Breast Compression and Breast Massage   To wean off of the shield start feeding with the shield and remove 1-2 minutes into feeding, try on just breast.  If baby latches, finish nursing. If not, re-apply shield and finish feeding. Try shield removal once nursing is going well, 1-2 times per day. 3. After breastfeeding, supplement your baby by bottle with 45-60 ml/ 1.5-2 oz of breast milk/formula: 30ml = 1oz. Position the baby upright to bottle feed. Ensure the nipple is all the way in the baby's mouth and lips are flanged around the bottle. 4. Pump for 5-10 minutes after nursing. Try to pump within 15 minutes of breastfeeding. Pump after daytime feedings for now since using nipple shield and supplementing. Be consistent. 5. If pumping and bottle feeding, give baby 60 ml/2 oz of breast milk/formula and double pump with massage and compression for 15 minutes.     Estimated Needs:  By 1 week, baby needs 15-17 oz of breast milk/formula per day based on 8 feedings per day. Baby needs 60 ml/2 oz of breast milk/formula per feeding. The more often baby eats the less volume they need per feeding. For today, offer 45 ml per feeding if doing 8 feedings. Date Weight Comments   4-15-20 6-5 Birthweight   4-17-20 5-15.5 Discharge Weight   4-20-20 5-13 At [facility]   4-21-20 5-12.5 Naked At [address] [place] [address] OP Lactation      Average weight gain for the first 3 months is 1oz per day. Minimum weight gain in the first 3 months is 0.5 oz per day. Typically regaining to birth weight by 2 weeks. Date Side Position Time Before Wt. After Wt Total   4-21-20 R* Cross  cradle 1052 11 min  2642 2646 4 ml     L*, **     Refused. *Medium/Standard nipple shield. ** Small nipple shield    Baby last ate at 0815. Nursed 10 min on L with nipple shield. Mom pumped 30 ml total which she took by curved tip syringe. Radha Ulloa was alert and interested in nursing. Noted baby appears jaundiced and has at rest tongue protrusion. Noted Ped follow up for bilirubin again tomorrow. Mom has been working to get [employer] and started using a nipple shield at home a few days ago. Baby has a very strong suck on digital assessment. She is still doing some tongue thrusting and her tongue appears to be large. Assisted with well supported positioning. Radha Ulloa could not get on deep enough to maintain her latch. Then applied mom's medium nipple shield and Radha Ulloa was able to latch. Mom's pumping volume is a little low. Radha Ulloa stayed on well and nursed. She only transferred 4 ml total.  Refused to latch to L with small or medium nipple shield. Mom can pump 15-20 ml per side. Radha Ulloa has been taking 30-40 ml per feeding. Discussed n the next 2 days milk need would be closer to 60 ml per feeding. Mom ok with supplementing if needed. Will try for 45 ml per feeding today based on weight and jaundice.  Noted plans to see Ped again in am.  Mom requested repeat feed and weigh for Friday. Baby will have had more volume and hopefully mom's pumping volume will be higher by then too. Discussed every other feed at breast for now since triple feeding (breast, pump, supplement). Mom planning to switch to a bottle from curved tip syringe since using a nipple shield. Baby's    Oral Digital Assessment:  Baby protrudes tongue through lips slightly at rest.  Tongue appears to be thicker and large for her mouth. She has a very powerful suck, but also tends to thrust.  Lips both roll under. Reviewed baby's at rest position with tongue protrusion may need to be followed up on with Ped. Output:  Soaking wets. Yellow, runny, seedy, frequent stools. Mom's    Nipples:  Small, short, but everted. Breasts:  Large. Full. Plan:  Start with every other feeding at breast.  Use nipple shield as needed. Will need to continue to supplement and pump after nursing for now. Check residual volume. Pump and bottle feed other feedings in-between. Follow efficiency at breast and milk supply. As nursing is going well, add in additional feedings at the breast.  One additional feeding every few days until fully nursing. Follow-up: To Ped tomorrow 4-22-20. Repeat feed and weigh Friday 4-24-20 per mom request.  Call as needed before.

## 2020-04-21 NOTE — LACTATION NOTE
2020  Re: Devorah Pritchard  4-15-20)    Dear Dr. Jerome Hair,     I saw Benjamin Tanner and her mother Marino Ray in our Lactation office today. Mom came in today to get assistance with breastfeeding. Date Weight Comments   4-15-20 6-5 Birthweight   20 5-15.5 Discharge Weight   20 5-13 At [facility]   20 5-12.5 Naked At [facility] OP Lactation      Feed and Weigh  1st Breast R with medium nipple shield for 11 min - 4 ml  2nd Breast refused   Total intake at breast  4 ml     Hilary Hicks was alert and interested in nursing. Noted baby appears jaundiced and has tongue protrusion at rest .  Noted Ped follow up for bilirubin again tomorrow. Mom has been working to [employer] and started using a nipple shield at home a few days ago. Baby has a very strong suck on digital assessment. She is still doing some tongue thrusting and her tongue appears to be large. Assisted with well supported positioning. Benjamin Tanner could not get on deep enough to maintain her latch. Then applied mom's medium nipple shield and Benjamin Tanner was able to latch. Mom's pumping volume is a little low. Benjamin Tanner stayed on well and nursed. She only transferred 4 ml total.  Refused to latch to L with small or medium nipple shield. Mom can pump 15-20 ml per side. Benjamin Tanner has been taking 30-40 ml per feeding. Discussed in the next 2 days milk need would be closer to 60 ml per feeding. Mom ok with supplementing if needed. Will try for 45 ml per feeding today based on weight and jaundice. Noted plans to see Ped again in am.  Mom requested repeat feed and weigh for Friday. Baby will have had more volume and hopefully mom's pumping volume will be higher by then too. Discussed every other feed at breast for now since triple feeding (breast, pump, supplement). Mom planning to switch to a bottle from curved tip syringe since using a nipple shield.        Estimated Needs:  By 1 week, baby needs 15-17 oz of breast milk/formula per day based on 8 feedings per day. Baby needs 60 ml/2 oz of breast milk/formula per feeding. The more often baby eats the less volume they need per feeding. For today, offer 45 ml per feeding if doing 8 feedings. Plan:  Start with every other feeding at breast.  Use nipple shield as needed. Will need to continue to supplement and pump after nursing for now. Check residual volume. Pump and bottle feed other feedings in-between. Follow efficiency at breast and milk supply. As nursing is going well, add in additional feedings at the breast.  One additional feeding every few days until fully nursing.     Follow-up: To Ped tomorrow 4-22-20. Repeat feed and weigh Friday 4-24-20 per mom request.  Call as needed before.     Sincerely,      Irma Lyons, 65 [address], [address] [place] [address]

## 2020-04-24 ENCOUNTER — HOSPITAL ENCOUNTER (OUTPATIENT)
Dept: LACTATION | Age: 30
Discharge: HOME | End: 2020-04-24
Payer: COMMERCIAL

## 2020-04-24 PROCEDURE — 99213 OFFICE O/P EST LOW 20 MIN: CPT

## 2020-04-24 NOTE — LACTATION NOTE
Outpatient Feeding Plan for Breastfeeding  [phone]  Patient: Cy Hammans  Gestational Age: 37w 2d  Diagnosis:  V 24.1/Z39.1 Difficulty latching. Ursula Chinchilla. Start Time:  1030 (early check-in)  Stop Time:  1120    Good, active breastfeeding is when baby is alert, tugging the nipple in long, deep pulls, and you can hear swallows every 1-2 sucks. By now Mom's milk should be in. Brief, light or shallow sucks or short rapid sucks without frequent swallows should not be considered a full breastfeeding. 1. Complete the following mouth exercises prior to feeding:  Gum Massage and Non-nutritive Sucking     2. Put the baby to the breast every other feeding, about 4 times per day for up to 15 minutes per side (as long as she is active and interested). Finish first side before offering other side. As nursing is going well, add in additional feedings at the breast.  One additional feeding every few days until fully nursing. Try without shield since she seems to transfer better without it. Use: Breast Compression and Breast Massage    3. After breastfeeding, supplement your baby by bottle with 45 (if well at breast) -60 ml/ 1.5-2 oz of breast milk/formula: 30ml = 1oz. Position the baby upright to bottle feed. Ensure the nipple is all the way in the baby's mouth and lips are flanged around the bottle.     4. Pump for 5-10 minutes after nursing. Try to pump within 15 minutes of breastfeeding. Pump after daytime feedings for now since using nipple shield and supplementing. Be consistent.     5. If pumping and bottle feeding, give baby 60 ml/2 oz of breast milk/formula and double pump with massage and compression for 15 minutes.     Estimated Needs:  Baby needs 15-17 oz of breast milk/formula per day based on 8 feedings per day. Baby needs 60 ml/2 oz of breast milk/formula per feeding. The more often baby eats the less volume they need per feeding. For today, offer 45 ml per feeding if doing 8 feedings.      Date Weight Comments   4-15-20 6-5 Birthweight   4-17-20 5-15.5 Discharge Weight   4-20-20 5-13 At [facility]   4-21-20 5-12.5 Naked At [facility] Lactation    4-23-20 5-15 At [facility]   4-24-20 6-0.1 Naked At [facility] Lactation      Average weight gain for the first 3 months is 1oz per day. Minimum weight gain in the first 3 months is 0.5 oz per day. Typically regaining to birth weight by 2 weeks. Date Side Position Time Before Wt. After Wt Total   4-24-20 R Cross cradle 1053 12 min  2740 2756 16 ml     L     Refused   Tired with bottle. Took 30 ml by bottle after, but was sleepy and started leaking. Handouts given:Tongue tie/OT    Baby last ate at 18. Fed 10 min L then took 55 ml of breastmilk with bottle. Pumped 60 ml total.  25-30 ml per side. Ryan Mendoza was alert and a little sleepy. Noted still appears jaundiced, but less so than 4-21-20 visit. Still maintaining tongue protrusion at rest.  A lot of tongue thrust.  Mom reports some latching with shield. Attempted on R without. She gets very close to latch, but then thrusts her way off. Added a little flow with a syringe and she was then able to find a rhythm and stayed on. Very slow at breast, but maintained latch and was able to take ~0.5 oz on R. Significantly better than at first visit with shield, but less than needed volume for full feeding. Refused to latch to L, seemed very tired. Took a bottle after nursing. Not vigorous. Leaking towards the end of 30 ml bottle feeding. Discussed following ability at breast.  Mom asking very thoughtful questions about noticeable differences in cheek roundness and baby's ability at breast with feedings. Discussed baby could still significantly improve as stamina increases. Suggested discussing possible additional help from SLP or OT with Ped. Mom also concerned about lip tie (she had her own revised at 14years old). Provided mom with additional resources. Mouth positioning and tongue movements will not change with lip tie revision, so there may be more than one variable with feeding at breast difficulty. Baby's    Oral Digital Assessment:  Tongue protrudes at rest.  Thrusting. Mom had a lip tie addressed with orthodontian. Dad has a long tongue per mom. Upper labial frenulum is tight. Upper lip rolls in. Tongue extends to her chin, but is straight down. L cheek significantly rounder, R cheek flat. Output:  Soaking wets. Yellow, runny, seedy, frequent stools. Mom's               Nipples:  Small, short, but everted. Breasts:  Large. Full. Plan:  Continue with every other feeding at breast.  Try without nipple shield, may help to give milk at breast with syringe to start. Will need to continue to supplement and pump after nursing for now. Check residual volume. Pump and bottle feed other feedings in-between. Follow efficiency at breast and milk supply. Follow improvement in mouth tone. May want to discuss evaluation by speech or OT if slow to improve with Ped. As nursing is going well, add in additional feedings at the breast.  One additional feeding every few days until fully nursing. Follow-up: To Ped 4-30-20. Will call 5-1-20. Call as needed before.

## 2020-04-24 NOTE — LACTATION NOTE
2020  Re: Brenden Bowers  4-15-20)    Dear Dr. Gary Sam,     I saw Glenn Dunbar and her mother Nina Conway in our Lactation office today. Mm came back today for additional help with breastfeeding.     Date Weight Comments   4-15-20 6-5 Birthweight   20 5-15.5 Discharge Weight   20 5-13 At [facility]   20 5-12.5 Naked At [facility] OP Lactation    20 5-15 At [facility]   20 6-0.1 Naked At [facility] OP Lactation      Feed and Weigh  1st Breast Rfor 12 min - 16 ml  2nd Breast refused  Total intake at breast  16 ml = ~ 0.5 oz     Gary Aguilar was alert and a little sleepy. Noted still appears jaundiced, but less so than 20 visit. Still maintaining tongue protrusion at rest.  A lot of tongue thrust.  Mom reports some latching with shield. Attempted on R without. She gets very close to latch, but then thrusts her way off. Added a little flow with a syringe and she was then able to find a rhythm and stayed on. Very slow at breast, but maintained latch and was able to take ~0.5 oz on R. Significantly better than at first visit with shield, but less than needed volume for full feeding. Refused to latch to L, seemed very tired. Took a bottle after nursing. Not vigorous. Leaking towards the end of 30 ml bottle feeding. Discussed following ability at breast.  Mom asking very thoughtful questions about noticeable differences in cheek roundness and baby's ability at breast with feedings. Discussed baby could still significantly improve as stamina increases. Suggested discussing possible additional help from SLP or OT with Ped. Mom also concerned about lip tie (she had her own revised at 14years old). Provided mom with additional resources. Mouth positioning and tongue movements will not change with lip tie revision, so there may be more than one variable with feeding at breast difficulty.     Estimated Needs:  Baby needs 15-17 oz of breast milk/formula per day based on 8 feedings per day.  Baby needs 60 ml/2 oz of breast milk/formula per feeding. The more often baby eats the less volume they need per feeding. For today, offer 45 ml per feeding if doing 8 feedings. Plan:  Continue with every other feeding at breast. Amol Pipe without nipple shield, may help to give milk at breast with syringe to start.  Will need to continue to supplement and pump after nursing for now. [facility] residual volume.  Pump and bottle feed other feedings in-between.  Follow efficiency at breast and milk supply.  Follow improvement in mouth tone. May want to discuss evaluation by speech or OT if slow to improve with Ped. As nursing is going well, add in additional feedings at the breast.  One additional feeding every few days until fully nursing.       Follow-up: To Ped 4-30-20. Will call 5-1-20.   Call as needed before.     Sincerely,      Peter Orr Sukhjinder [address], [address] [place] [address], [address] [place] [address]

## 2020-05-27 ENCOUNTER — HOSPITAL ENCOUNTER (OUTPATIENT)
Dept: MAMMOGRAPHY | Age: 30
Discharge: HOME | End: 2020-05-27
Attending: OBSTETRICS & GYNECOLOGY
Payer: COMMERCIAL

## 2020-05-27 ENCOUNTER — HOSPITAL ENCOUNTER (OUTPATIENT)
Dept: LACTATION | Age: 30
Discharge: HOME | End: 2020-05-27

## 2020-05-27 VITALS — HEART RATE: 97 BPM | SYSTOLIC BLOOD PRESSURE: 134 MMHG | DIASTOLIC BLOOD PRESSURE: 73 MMHG

## 2020-05-27 DIAGNOSIS — O92.79 CLOGGED DUCT, POSTPARTUM: ICD-10-CM

## 2020-05-27 DIAGNOSIS — N60.11 CHRONIC MASTITIS OF RIGHT BREAST: ICD-10-CM

## 2020-05-27 PROCEDURE — 87077 CULTURE AEROBIC IDENTIFY: CPT

## 2020-05-27 PROCEDURE — 87075 CULTR BACTERIA EXCEPT BLOOD: CPT

## 2020-05-27 PROCEDURE — 87186 SC STD MICRODIL/AGAR DIL: CPT

## 2020-05-27 PROCEDURE — 87205 SMEAR GRAM STAIN: CPT

## 2020-05-27 PROCEDURE — 87102 FUNGUS ISOLATION CULTURE: CPT

## 2020-05-27 PROCEDURE — 76642 ULTRASOUND BREAST LIMITED: CPT

## 2020-05-27 PROCEDURE — 76942 ECHO GUIDE FOR BIOPSY: CPT

## 2020-05-27 PROCEDURE — 74011000250 HC RX REV CODE- 250: Performed by: OBSTETRICS & GYNECOLOGY

## 2020-05-27 RX ORDER — LIDOCAINE HYDROCHLORIDE 10 MG/ML
5 INJECTION INFILTRATION; PERINEURAL
Status: COMPLETED | OUTPATIENT
Start: 2020-05-27 | End: 2020-05-27

## 2020-05-27 RX ADMIN — LIDOCAINE HYDROCHLORIDE 5 ML: 10 INJECTION, SOLUTION INFILTRATION; PERINEURAL at 09:38

## 2020-05-27 NOTE — LACTATION NOTE
2020  Re: Roz Fisher  4[birth year]15-20)    Dear Dr. Melo Avilez,     I saw Toni Rizzo and her mother Oksana Ash in our Lactation office today. Mom came in today for an idea of milk transfer when nursing. Date Weight Comments   4-15-20 6-5 Birthweight   20 5-15.5 Discharge Weight   20 5-13 At [facility]   20 5-12.5 Naked At [place] OP Lactation    20 5-15 At [facility]   20 6-0.1 Naked At [place] OP Lactation   20 8-2 At [facility]   20 9-1.3 Naked At [facility] Lactation        Feed and Weigh  1st Breast L for 22 min - 36 ml  2nd Breast not done due to abscess    Total intake at breast - 36 ml = ~ 1.2 oz     Genaro Vidal was alert and ready to nurse. She latched fairly easily in football on L and was active for her whole feeding. She transferred ~1 oz, mom can typically pump up to 2 oz on L side. Mom is still following up on possible tongue tie. Toni Rizzo has a lot of range of motion of her tongue and it seems to be quite long. She also has an indent when her tongue is extended and a visibly short lingual frenulum. Noted mom is pursuing evaluation from Dr. Magnolia Garcia. Discussed difficulty at breast is probably multifactorial.  Toni Rizzo has improved on milk removal, but is taking about 1/3 of her feeding at breast. Toni Rizzo is gaining weight well and mom has bottle feeding down to about 30 minutes. Will follow up with lactation as needed. Estimated Needs:  Baby needs 22-24 oz of breast milk/formula per day based on 7 feedings per day. Baby needs  ml/3-3.5 oz of breast milk/formula per feeding. The more often baby eats the less volume they need per feeding. Plan:  Continue to follow OT recommendations. Feed on demand. Continue to supplement after nursing for now. Pump as able. Watch for breast healing.       Follow-up: To Ped 20. Call as needed before.     Helmet evaluation at 2 month appointment per mom. Flat spot on R.   Started OT with [facility] in home at 3 weeks. Has had 2 visits.   Plans to see again with results of feed and weigh.       Sincerely,      Peter Miller Sukhj[address], [address] [place] [address], [address] [facility]

## 2020-05-27 NOTE — LACTATION NOTE
Outpatient Feeding Plan for Breastfeeding  [phone]  Patient: Trude Holstein \"Luda\"  Gestational Age: 37w 2d  Diagnosis:  V 24.1/Z39.1 Difficulty latching. [address] [facility] [address] Kavon Chaparro  OB/GYN Dr. Patrick [address]. Start Time:  1425  Stop Time:  1515    Good, active breastfeeding is when baby is alert, tugging the nipple in long, deep pulls, and you can hear swallows every 1-2 sucks. By now Mom's milk should be in. Brief, light or shallow sucks or short rapid sucks without frequent swallows should not be considered a full breastfeeding. 1. Complete the following mouth exercises prior to feeding: Per OT    2. Put the baby to the breast on demand as desired 3 -4 times per day for 15-20 minutes per side. Finish first side before offering other side. Assume she is taking ~30 ml on L and if on R, 15 ml. Use: Breast Compression and Breast Massage    3. After breastfeeding, supplement your baby by bottle with 60-90 ml/ 2-3 oz of breast milk/formula: 30ml = 1oz. Position the baby upright to bottle feed. Ensure the nipple is all the way in the baby's mouth and lips are flanged around the bottle. 4. Pump for 5-10 minutes after nursing. Try to pump within 15 minutes of breastfeeding. Pump as able after nursing. Be consistent. 5. If pumping and bottle feeding, give baby  ml/3-3.5 oz of breast milk/formula and double pump with massage and compression for 15 minutes. Estimated Needs:  Baby needs 22-24 oz of breast milk/formula per day based on 7 feedings per day. Baby needs  ml/3-3.5 oz of breast milk/formula per feeding. The more often baby eats the less volume they need per feeding.      Date Weight Comments   4-15-20 6-5 Birthweight   4-17-20 5-15.5 Discharge Weight   4-20-20 5-13 At [facility]   4-21-20 5-12.5 Naked At [facility] OP Lactation    4-23-20 5-15 At [facility]   4-24-20 6-0.1 Naked At [facility] OP Lactation   5-18-20 8-2 At [facility]   5-27-20 9-1.3 Naked At [place] OP Lactation      Average weight gain for the first 3 months is 1oz per day. Minimum weight gain in the first 3 months is 0.5 oz per day. Typically regaining to birth weight by 2 weeks. Date Side Position Time Before Wt. After Wt Total   5-27-20 L Football  1439 22 min  4140 4176 36 ml     R skipped         Handouts given:  Plugged Duct. Baby last ate at Took 90 ml at 1245/  Usually takes . Pumped at 1245. Takes 120 ml per feeding. Takes 60-90 ml after nursing. Mom had an abscess drained today on R breast.  Pumping 2 oz on L and 1 oz on R.  Gets 30 ml on L and 10-15 ml on R. Still prefers looking to R, laying on L. Went chiropractor for first time today. Dr. Orly Ingram. Mom also sees her. Minoo Gao was alert and ready to nurse. Mom came in today for an idea of milk transfer when nursing. Minoo Gao latched fairly easily in football on L and was active for her whole feeding. She transferred ~1 oz, mom can typically pump up to 2 oz on L side. Mom is still following up on possible tongue tie. Minoo Gao has a lot of range of motion of her tongue and it seems to be quite long. She also has an indent when her tongue is extended and a visibly short lingual frenulum. Noted mom is pursuing evaluation from Dr. Naya Springer. Discussed difficulty at breast is probably multifactorial.  Minoo Gao has improved on milk removal, but is taking about 1/3 of her feeding at breast. Minoo Gao is gaining weight well and mom has bottle feeding down to about 30 minutes. Will follow up with lactation as needed. Baby's    Oral Digital Assessment:  Better tongue cupping today. Tongue is still out beyond her lips. Tongue has a lot of mobility and is very long. Has heart shape at tip and frenulum appears short. Plans to see Dr. Naya Springer to evaluate. Referral from Krysta Sethi. Output:  Soaking wets. Yellow, runny, seedy, frequent stools. Mom's    Nipples:  Well everted. Breasts:  Large.   R side abscess drained today at [facility])    Plan:  Continue to follow OT recommendations. Feed on demand. Continue to supplement after nursing for now. Pump as able. Watch for breast healing. Follow-up: To Ped 6-22-20. Call as needed before. Helmet evaluation at 2 month appointment per mom. Flat spot on R. Started OT with [facility] in home at 3 weeks. Has had 2 visits. Plans to see again with results of feed and weigh.

## 2020-05-29 LAB
BACTERIA SPEC CULT: ABNORMAL
GRAM STN SPEC: ABNORMAL
GRAM STN SPEC: ABNORMAL
SERVICE CMNT-IMP: ABNORMAL

## 2020-06-03 LAB
BACTERIA SPEC CULT: NORMAL
SERVICE CMNT-IMP: NORMAL

## 2020-06-11 PROBLEM — N61.1 ABSCESS OF RIGHT BREAST: Status: ACTIVE | Noted: 2020-06-11

## 2020-06-26 LAB
FUNGUS CULTURE, RFCO2T: NEGATIVE
FUNGUS SMEAR, RFCO1T: NORMAL
FUNGUS SPEC CULT: NORMAL
FUNGUS STAIN, 188244: NORMAL
REFLEX TO ID, RFCO3T: NORMAL
SPECIMEN SOURCE: NORMAL
SPECIMEN SOURCE: NORMAL

## 2022-03-19 PROBLEM — Z34.90 PREGNANCY: Status: ACTIVE | Noted: 2019-09-24

## 2022-03-19 PROBLEM — Z37.9 NORMAL LABOR: Status: ACTIVE | Noted: 2020-04-15

## 2022-03-19 PROBLEM — N61.1 ABSCESS OF RIGHT BREAST: Status: ACTIVE | Noted: 2020-06-11

## 2022-06-27 ENCOUNTER — ANESTHESIA EVENT (OUTPATIENT)
Dept: LABOR AND DELIVERY | Age: 32
End: 2022-06-27
Payer: COMMERCIAL

## 2022-06-27 ENCOUNTER — ANESTHESIA (OUTPATIENT)
Dept: LABOR AND DELIVERY | Age: 32
End: 2022-06-27
Payer: COMMERCIAL

## 2022-06-27 ENCOUNTER — HOSPITAL ENCOUNTER (INPATIENT)
Age: 32
LOS: 3 days | Discharge: HOME | End: 2022-06-30
Attending: OBSTETRICS & GYNECOLOGY | Admitting: OBSTETRICS & GYNECOLOGY
Payer: COMMERCIAL

## 2022-06-27 DIAGNOSIS — O26.893 ABDOMINAL PAIN DURING PREGNANCY IN THIRD TRIMESTER: ICD-10-CM

## 2022-06-27 DIAGNOSIS — R10.9 ABDOMINAL PAIN DURING PREGNANCY IN THIRD TRIMESTER: ICD-10-CM

## 2022-06-27 DIAGNOSIS — Z34.90 PREGNANCY, UNSPECIFIED GESTATIONAL AGE: Primary | ICD-10-CM

## 2022-06-27 LAB
ABO + RH BLD: NORMAL
BLOOD GROUP ANTIBODIES SERPL: NORMAL
ERYTHROCYTE [DISTWIDTH] IN BLOOD BY AUTOMATED COUNT: 12.7 % (ref 11.9–14.6)
HCT VFR BLD AUTO: 35 % (ref 35.8–46.3)
HGB BLD-MCNC: 12.1 G/DL (ref 11.7–15.4)
MCH RBC QN AUTO: 30.6 PG (ref 26.1–32.9)
MCHC RBC AUTO-ENTMCNC: 34.6 G/DL (ref 31.4–35)
MCV RBC AUTO: 88.4 FL (ref 79.6–97.8)
NRBC # BLD: 0 K/UL (ref 0–0.2)
PLATELET # BLD AUTO: 196 K/UL (ref 150–450)
PMV BLD AUTO: 12.8 FL (ref 9.4–12.3)
RBC # BLD AUTO: 3.96 M/UL (ref 4.05–5.2)
SPECIMEN EXP DATE BLD: NORMAL
WBC # BLD AUTO: 10.1 K/UL (ref 4.3–11.1)

## 2022-06-27 PROCEDURE — 3700000025 EPIDURAL BLOCK: Performed by: STUDENT IN AN ORGANIZED HEALTH CARE EDUCATION/TRAINING PROGRAM

## 2022-06-27 PROCEDURE — 2500000003 HC RX 250 WO HCPCS: Performed by: REGISTERED NURSE

## 2022-06-27 PROCEDURE — 86901 BLOOD TYPING SEROLOGIC RH(D): CPT

## 2022-06-27 PROCEDURE — 85027 COMPLETE CBC AUTOMATED: CPT

## 2022-06-27 PROCEDURE — 6360000002 HC RX W HCPCS: Performed by: REGISTERED NURSE

## 2022-06-27 PROCEDURE — 1100000000 HC RM PRIVATE

## 2022-06-27 PROCEDURE — 99283 EMERGENCY DEPT VISIT LOW MDM: CPT

## 2022-06-27 PROCEDURE — 59025 FETAL NON-STRESS TEST: CPT

## 2022-06-27 RX ORDER — SODIUM CHLORIDE, SODIUM LACTATE, POTASSIUM CHLORIDE, AND CALCIUM CHLORIDE .6; .31; .03; .02 G/100ML; G/100ML; G/100ML; G/100ML
1000 INJECTION, SOLUTION INTRAVENOUS PRN
Status: DISCONTINUED | OUTPATIENT
Start: 2022-06-27 | End: 2022-06-28 | Stop reason: HOSPADM

## 2022-06-27 RX ORDER — SODIUM CHLORIDE 0.9 % (FLUSH) 0.9 %
5-40 SYRINGE (ML) INJECTION EVERY 12 HOURS SCHEDULED
Status: DISCONTINUED | OUTPATIENT
Start: 2022-06-27 | End: 2022-06-28 | Stop reason: HOSPADM

## 2022-06-27 RX ORDER — ONDANSETRON 2 MG/ML
4 INJECTION INTRAMUSCULAR; INTRAVENOUS EVERY 6 HOURS PRN
Status: DISCONTINUED | OUTPATIENT
Start: 2022-06-27 | End: 2022-06-28 | Stop reason: HOSPADM

## 2022-06-27 RX ORDER — SODIUM CHLORIDE 0.9 % (FLUSH) 0.9 %
5-40 SYRINGE (ML) INJECTION PRN
Status: DISCONTINUED | OUTPATIENT
Start: 2022-06-27 | End: 2022-06-28 | Stop reason: HOSPADM

## 2022-06-27 RX ORDER — EPHEDRINE SULFATE/0.9% NACL/PF 50 MG/5 ML
SYRINGE (ML) INTRAVENOUS PRN
Status: DISCONTINUED | OUTPATIENT
Start: 2022-06-27 | End: 2022-06-28 | Stop reason: SDUPTHER

## 2022-06-27 RX ORDER — SODIUM CHLORIDE, SODIUM LACTATE, POTASSIUM CHLORIDE, AND CALCIUM CHLORIDE .6; .31; .03; .02 G/100ML; G/100ML; G/100ML; G/100ML
500 INJECTION, SOLUTION INTRAVENOUS PRN
Status: DISCONTINUED | OUTPATIENT
Start: 2022-06-27 | End: 2022-06-28 | Stop reason: HOSPADM

## 2022-06-27 RX ORDER — SODIUM CHLORIDE, SODIUM LACTATE, POTASSIUM CHLORIDE, CALCIUM CHLORIDE 600; 310; 30; 20 MG/100ML; MG/100ML; MG/100ML; MG/100ML
INJECTION, SOLUTION INTRAVENOUS CONTINUOUS
Status: DISCONTINUED | OUTPATIENT
Start: 2022-06-27 | End: 2022-06-28 | Stop reason: HOSPADM

## 2022-06-27 RX ORDER — SODIUM CHLORIDE 9 MG/ML
25 INJECTION, SOLUTION INTRAVENOUS PRN
Status: DISCONTINUED | OUTPATIENT
Start: 2022-06-27 | End: 2022-06-28 | Stop reason: HOSPADM

## 2022-06-27 RX ORDER — ROPIVACAINE HYDROCHLORIDE 2 MG/ML
INJECTION, SOLUTION EPIDURAL; INFILTRATION; PERINEURAL PRN
Status: DISCONTINUED | OUTPATIENT
Start: 2022-06-27 | End: 2022-06-28 | Stop reason: SDUPTHER

## 2022-06-27 RX ADMIN — ROPIVACAINE HYDROCHLORIDE 10 ML/HR: 2 INJECTION, SOLUTION EPIDURAL; INFILTRATION; PERINEURAL at 23:10

## 2022-06-27 RX ADMIN — Medication 20 MG: at 23:11

## 2022-06-27 RX ADMIN — Medication 20 MG: at 23:00

## 2022-06-27 RX ADMIN — ROPIVACAINE HYDROCHLORIDE 8 ML: 2 INJECTION, SOLUTION EPIDURAL; INFILTRATION; PERINEURAL at 23:09

## 2022-06-28 PROCEDURE — 7210000100 HC LABOR FEE PER 1 HR

## 2022-06-28 PROCEDURE — 6370000000 HC RX 637 (ALT 250 FOR IP): Performed by: OBSTETRICS & GYNECOLOGY

## 2022-06-28 PROCEDURE — 51701 INSERT BLADDER CATHETER: CPT

## 2022-06-28 PROCEDURE — 99283 EMERGENCY DEPT VISIT LOW MDM: CPT

## 2022-06-28 PROCEDURE — 2580000003 HC RX 258: Performed by: OBSTETRICS & GYNECOLOGY

## 2022-06-28 PROCEDURE — 4A1HXCZ MONITORING OF PRODUCTS OF CONCEPTION, CARDIAC RATE, EXTERNAL APPROACH: ICD-10-PCS | Performed by: OBSTETRICS & GYNECOLOGY

## 2022-06-28 PROCEDURE — 59025 FETAL NON-STRESS TEST: CPT

## 2022-06-28 PROCEDURE — 7100000010 HC PHASE II RECOVERY - FIRST 15 MIN

## 2022-06-28 PROCEDURE — 7100000011 HC PHASE II RECOVERY - ADDTL 15 MIN

## 2022-06-28 PROCEDURE — 1100000000 HC RM PRIVATE

## 2022-06-28 PROCEDURE — 7220000101 HC DELIVERY VAGINAL/SINGLE

## 2022-06-28 RX ORDER — OXYCODONE HYDROCHLORIDE 5 MG/1
5 TABLET ORAL EVERY 4 HOURS PRN
Status: DISCONTINUED | OUTPATIENT
Start: 2022-06-28 | End: 2022-06-30 | Stop reason: HOSPADM

## 2022-06-28 RX ORDER — SODIUM CHLORIDE 9 MG/ML
INJECTION, SOLUTION INTRAVENOUS PRN
Status: DISCONTINUED | OUTPATIENT
Start: 2022-06-28 | End: 2022-06-29

## 2022-06-28 RX ORDER — SODIUM CHLORIDE 0.9 % (FLUSH) 0.9 %
5-40 SYRINGE (ML) INJECTION PRN
Status: DISCONTINUED | OUTPATIENT
Start: 2022-06-28 | End: 2022-06-29

## 2022-06-28 RX ORDER — OXYCODONE HYDROCHLORIDE 5 MG/1
10 TABLET ORAL EVERY 4 HOURS PRN
Status: DISCONTINUED | OUTPATIENT
Start: 2022-06-28 | End: 2022-06-30 | Stop reason: HOSPADM

## 2022-06-28 RX ORDER — MAGNESIUM CARB/ALUMINUM HYDROX 105-160MG
120 TABLET,CHEWABLE ORAL
Status: COMPLETED | OUTPATIENT
Start: 2022-06-28 | End: 2022-06-28

## 2022-06-28 RX ORDER — LANOLIN
CREAM (ML) TOPICAL PRN
Status: DISCONTINUED | OUTPATIENT
Start: 2022-06-28 | End: 2022-06-30 | Stop reason: HOSPADM

## 2022-06-28 RX ORDER — SODIUM CHLORIDE 0.9 % (FLUSH) 0.9 %
5-40 SYRINGE (ML) INJECTION EVERY 12 HOURS SCHEDULED
Status: DISCONTINUED | OUTPATIENT
Start: 2022-06-28 | End: 2022-06-28

## 2022-06-28 RX ORDER — DOCUSATE SODIUM 100 MG/1
100 CAPSULE, LIQUID FILLED ORAL 2 TIMES DAILY
Status: DISCONTINUED | OUTPATIENT
Start: 2022-06-28 | End: 2022-06-30 | Stop reason: HOSPADM

## 2022-06-28 RX ORDER — ACETAMINOPHEN 500 MG
1000 TABLET ORAL EVERY 6 HOURS
Status: DISCONTINUED | OUTPATIENT
Start: 2022-06-28 | End: 2022-06-30 | Stop reason: HOSPADM

## 2022-06-28 RX ORDER — SODIUM CHLORIDE, SODIUM LACTATE, POTASSIUM CHLORIDE, CALCIUM CHLORIDE 600; 310; 30; 20 MG/100ML; MG/100ML; MG/100ML; MG/100ML
INJECTION, SOLUTION INTRAVENOUS CONTINUOUS
Status: DISCONTINUED | OUTPATIENT
Start: 2022-06-28 | End: 2022-06-29

## 2022-06-28 RX ORDER — IBUPROFEN 800 MG/1
800 TABLET ORAL EVERY 8 HOURS
Status: DISCONTINUED | OUTPATIENT
Start: 2022-06-28 | End: 2022-06-30 | Stop reason: HOSPADM

## 2022-06-28 RX ORDER — ONDANSETRON 4 MG/1
8 TABLET, ORALLY DISINTEGRATING ORAL EVERY 8 HOURS PRN
Status: DISCONTINUED | OUTPATIENT
Start: 2022-06-28 | End: 2022-06-30 | Stop reason: HOSPADM

## 2022-06-28 RX ORDER — POLYETHYLENE GLYCOL 3350 17 G/17G
17 POWDER, FOR SOLUTION ORAL DAILY PRN
Status: DISCONTINUED | OUTPATIENT
Start: 2022-06-28 | End: 2022-06-30 | Stop reason: HOSPADM

## 2022-06-28 RX ADMIN — IBUPROFEN 800 MG: 800 TABLET ORAL at 14:51

## 2022-06-28 RX ADMIN — SODIUM CHLORIDE, POTASSIUM CHLORIDE, SODIUM LACTATE AND CALCIUM CHLORIDE: 600; 310; 30; 20 INJECTION, SOLUTION INTRAVENOUS at 05:22

## 2022-06-28 RX ADMIN — DOCUSATE SODIUM 100 MG: 100 CAPSULE ORAL at 22:28

## 2022-06-28 RX ADMIN — ACETAMINOPHEN 1000 MG: 500 TABLET, FILM COATED ORAL at 16:21

## 2022-06-28 RX ADMIN — IBUPROFEN 800 MG: 800 TABLET ORAL at 22:28

## 2022-06-28 RX ADMIN — Medication 120 ML: at 10:31

## 2022-06-28 RX ADMIN — ACETAMINOPHEN 1000 MG: 500 TABLET, FILM COATED ORAL at 22:28

## 2022-06-28 NOTE — LACTATION NOTE

## 2022-06-28 NOTE — PROGRESS NOTES
Patient up to bathroom with minimal assistance. Kiersten-care taught and completed. Questions encouraged and answered. Patient back to bed, encouraged to call for needs or concerns. Verbalizes understanding.

## 2022-06-28 NOTE — L&D DELIVERY NOTE
Mother's Information    Labor Events     Labor?: No  Cervical Ripening:   Now         Jonny Diaz [[id number]]    Labor Events     Labor?: No   Steroids?: None  Cervical Ripening Date/Time:     Antibiotics Received during Labor?: No  Rupture Identifier: Sac 1   Rupture Date/Time: 22 09:49:00   Rupture Type: Intact, Bulging, AROM  Fluid Color: Clear  Fluid Odor: None  Fluid Volume:  Moderate  Labor Complications: Meconium at birth     Anesthesia    Method: Epidural     Start Pushing    Labor onset date/time: 22 22:00:00 Now   Dilation complete date/time:   Now   Start pushing date/time:    Decision date/time (emergent ):       Delivery ()    Delivery Date/Time:  22 10:15:00   Delivery Type: Vaginal, Spontaneous    Details:         Presentation    Presentation: Vertex  Position: Right  _: Occiput  _: Anterior     Shoulder Dystocia    Shoulder Dystocia Present?: No  Add Second Maneuver  Add Third Maneuver  Add Fourth Maneuver  Add Fifth Maneuver  Add Sixth Maneuver  Add Seventh Maneuver  Add Eighth Maneuver  Add Ninth Maneuver     Assisted Delivery Details    Forceps Attempted?: No  Vacuum Extractor Attempted?: No     Document Additional Attempt       Document Additional Attempt             Cord    Vessels: 3 Vessels  Complications: None  Delayed Cord Clamping?: Yes  Cord Clamped Date/Time: 2022 10:17:28  Cord Blood Disposition: Lab  Gases Sent?: No     Placenta    Date/Time: 2022 10:27:00  Removal: Expressed  Appearance: Intact  Disposition: Discarded     Lacerations    Episiotomy: None     Vaginal Counts    Initial Count Personnel: Nader Yap  Initial Count Verified By: Catrina Sainz    Sponges Needles Instruments   Initial Counts Correct     Final Counts Correct     Final Count Personnel: Nader Yap  Final Count Verified By: Catrina Sainz  If the count is incorrect due to Intentionally Retained Foreign Object (IRFO) add the IRFO LDA in Lines/Drains. Add LDA: Link to [facility]     Blood Loss  Mother: Deedee Still \"Kesha\" #[id number]   Start of Mother's Information    Delivery Blood Loss  22 2200 - 22 1035    None           End of Mother's Information  Mother: Deedee Still \"Kesha\" #[id number]          Delivery Providers    Delivering clinician: Noemi Bravo MD     Provider Role    Noemi Bravo MD Obstetrician    Jasmyn Bro, MARA Primary Nurse    Alysha Gill RN Primary [place] Nurse    Jaxon Patterson RN Charge Nurse    Aida Lipscomb Scrub Tech    Yasir Ovalles MD Neonatologist    Hernando Adams, P Respiratory Therapist           Assessment    Living Status: Living  Delivery Location Comment: 424     Apgar Scoring Key:    0 1 2    Skin Color: Blue or pale Acrocyanotic Completely pink    Heart Rate: Absent <100 bpm >100 bpm    Reflex Irritability: No response Grimace Cry or active withdrawal    Muscle Tone: Limp Some flexion Active motion    Respiratory Effort: Absent Weak cry; hypoventilation Good, crying                  Skin Color:   Heart Rate:   Reflex Irritability:   Muscle Tone:   Respiratory Effort: Total:            1 Minute:    0    2    2    1    2    7        Apgar 1 total from OB History    5 Minute:    1    2    2    2    2    9        Apgar 5 total from OB History    10 Minute:              15 Minute:              20 Minute:                      Apgars Assigned By: Kay Álvarez          Resuscitation    Method: Bulb Suction, Stimulation           [place] Measurements    Birth Weight: 3930 g Birth Length: 0.53 m   Head Circumference: 0.35 m Chest Circumference: 0.34 m          Title    Skin to Skin Initiation Date/Time: 22 10:15:00 EDT   Skin to Skin With: Mother   Skin to Skin End Date/Time:                      Delivery Note   of LVMI over intact perineum. Placenta delivered with fundal massage. Some clot cleared from uterus after placenta. Quickly firm after evacuation of clot. Apgars 7,9. Wt 3930g, 8#11oz. Obstetrician:  Ninfa Still MD    Assistant: Becka Hanks    Pre-Delivery Diagnosis: Term pregnancy and Spontaneous labor    Post-Delivery Diagnosis: Living  infant(s) and Male    Intrapartum Event: Meconium    Procedure: Spontaneous vaginal delivery    Epidural: yes    Monitor:  External toco and FM    Indications for instrumental delivery: none    Estimated Blood Loss: see QBL    Episiotomy: n/a    Laceration(s):  n/a and none    Laceration(s) repair: no    Presentation: Cephalic    Fetal Description: devine    Fetal Position: Right Occiput Anterior    Birth Weight: 4894M, 8#11oz    Birth Length: 20.8in    Apgar - One Minute: 7    Apgar - Five Minutes: 9    Umbilical Cord: normal  Specimens: none  Complications:  none           Cord Blood Results:   Information for the patient's :  Rebekah Mayorgaial [[id number]]   No results found for: PCTABR, PCTDIG, BILI     Prenatal Labs:   No results found for: PCTABR     Attending Attestation: I was present and scrubbed for the entire procedure.

## 2022-06-28 NOTE — PROGRESS NOTES
Safety Teaching reviewed:   1. Hand hygiene prior to handling the infant. 2. Use of bulb syringe  3. Bracelets with matching numbers are placed on mother and infant  3. An infant security tag  [facility]) is placed on the infant's ankle and monitored  5. All OB nurses wear pink Employee badges - do not give your baby to anyone without proper identification. 6. Never leave the baby alone in the room. 7. The infant should be placed on their back to sleep. on a firm mattress. No toys should be placed in the crib. (safe sleep video offered to view)  8. Never shake the baby (video offered to view)  9. Infant fall prevention - do not sleep with the baby, and place the baby in the crib while ambulating. 8. Mother and Baby Care booklet given to Mother.

## 2022-06-28 NOTE — PROGRESS NOTES
Patient able to void for the second time, IV removed, 18 gauge catheter in tact, patient tolerated well.

## 2022-06-28 NOTE — ANESTHESIA PRE PROCEDURE
Department of Anesthesiology  Preprocedure Note       Name:  Sanchez Gilman   Age:  27 y.o.  :  1990                                          MRN:  [id number]         Date:  2022      Surgeon: * No surgeons listed *    Procedure: * No procedures listed *    Medications prior to admission:   Prior to Admission medications    Medication Sig Start Date End Date Taking?  Authorizing Provider   sertraline (ZOLOFT) 50 MG tablet Take 50 mg by mouth every morning   Yes Historical Provider, MD   estradiol (ESTRACE) 0.1 MG/GM vaginal cream Place 2 g vaginally daily  Patient not taking: Reported on 2022   Ar Automatic Reconciliation   fexofenadine-pseudoephedrine (ALLEGRA-D 12HR)  MG per extended release tablet Take 1 tablet by mouth every 12 hours  Patient not taking: Reported on 2022    Ar Automatic Reconciliation   ibuprofen (ADVIL;MOTRIN) 800 MG tablet Take 800 mg by mouth every 6 hours as needed  Patient not taking: Reported on 2022   Ar Automatic Reconciliation       Current medications:    Current Facility-Administered Medications   Medication Dose Route Frequency Provider Last Rate Last Admin    lactated ringers infusion   IntraVENous Continuous Radha Gomes MD        lactated ringers bolus  500 mL IntraVENous PRN Radha Gomes MD        Or    lactated ringers bolus  1,000 mL IntraVENous PRN Radha Gomes MD        sodium chloride flush 0.9 % injection 5-40 mL  5-40 mL IntraVENous 2 times per day Radha Gomes MD        sodium chloride flush 0.9 % injection 5-40 mL  5-40 mL IntraVENous PRN Radha Gomes MD        0.9 % sodium chloride infusion  25 mL IntraVENous PRN Radha Gomes MD        butorphanol (STADOL) injection 1 mg  1 mg IntraVENous Q3H PRN Radha Gomes MD        ondansetron [facility]) injection 4 mg  4 mg IntraVENous Q6H PRN Radha Gomes MD         Facility-Administered Medications Ordered in Other Encounters   Medication Dose Route Frequency Provider Last Rate Last Admin    ropivacaine (NAROPIN) 0.2% injection 0.2%   Epidural PRN Mario Fennel, APRN - CRNA   10 mL/hr at 06/27/22 2310    ePHEDrine injection   IntraVENous PRN Mario Fennel, APRN - CRNA   20 mg at 06/27/22 2300       Allergies:  No Known Allergies    Problem List:    Patient Active Problem List   Diagnosis Code    Normal labor O80, Z37.9    Pregnancy Z34.90    Abscess of right breast N61.1    Abdominal pain during pregnancy in third trimester O26.893, R10.9       Past Medical History:        Diagnosis Date    Mononucleosis 2009       Past Surgical History:        Procedure Laterality Date    US BREAST CYST ASPIRATION RIGHT Right 5/27/2020    US BREAST CYST ASPIRATION RIGHT 5/27/2020 [facility]    WISDOM TOOTH EXTRACTION         Social History:    Social History     Tobacco Use    Smoking status: Never Smoker    Smokeless tobacco: Never Used   Substance Use Topics    Alcohol use: Not Currently                                Counseling given: Not Answered      Vital Signs (Current):   Vitals:    06/27/22 2308 06/27/22 2309 06/27/22 2314 06/27/22 2315   BP: 102/64 97/64 128/60 121/60   Pulse: 81 87 78 78   Resp:       Temp:       TempSrc:       SpO2:       Weight:       Height:                                                  BP Readings from Last 3 Encounters:   06/27/22 121/60       NPO Status:                                                                                 BMI:   Wt Readings from Last 3 Encounters:   06/27/22 200 lb (90.7 kg)     Body mass index is 28.7 kg/m².     CBC:   Lab Results   Component Value Date    WBC 10.1 06/27/2022    RBC 3.96 06/27/2022    HGB 12.1 06/27/2022    HCT 35.0 06/27/2022    MCV 88.4 06/27/2022    RDW 12.7 06/27/2022     06/27/2022       CMP:   Lab Results   Component Value Date     04/15/2020    K 3.7 04/15/2020     04/15/2020    CO2 20 04/15/2020    BUN 7 04/15/2020    CREATININE 0.63 04/15/2020    GFRAA >60 04/15/2020    AGRATIO 0.7 04/15/2020    GLUCOSE 93 04/15/2020    PROT 6.7 04/15/2020    CALCIUM 8.9 04/15/2020    BILITOT 0.1 04/15/2020    ALKPHOS 146 04/15/2020    AST 20 04/15/2020    ALT 14 04/15/2020       POC Tests: No results for input(s): POCGLU, POCNA, POCK, POCCL, POCBUN, POCHEMO, POCHCT in the last 72 hours. Coags: No results found for: PROTIME, INR, APTT    HCG (If Applicable): No results found for: PREGTESTUR, PREGSERUM, HCG, HCGQUANT     ABGs:   Lab Results   Component Value Date    PHART 7.235 04/15/2020    PO2ART 21 04/15/2020    LCC8EEP 54.7 04/15/2020    FVG1KEZ 23.2 04/15/2020        Type & Screen (If Applicable):  No results found for: LABABO, LABRH    Drug/Infectious Status (If Applicable):  No results found for: HIV, HEPCAB    COVID-19 Screening (If Applicable): No results found for: COVID19        Anesthesia Evaluation  Patient summary reviewed and Nursing notes reviewed  Airway: Mallampati: I  TM distance: >3 FB   Neck ROM: full  Mouth opening: > = 3 FB   Dental: normal exam         Pulmonary:Negative Pulmonary ROS breath sounds clear to auscultation                             Cardiovascular:  Exercise tolerance: good (>4 METS),           Rhythm: regular  Rate: normal                    Neuro/Psych:   Negative Neuro/Psych ROS              GI/Hepatic/Renal:             Endo/Other:                     Abdominal:             Vascular: negative vascular ROS. Other Findings:           Anesthesia Plan      epidural     ASA 1             Anesthetic plan and risks discussed with patient and spouse.                         Yonatan Huynh MD   6/27/2022

## 2022-06-28 NOTE — ANESTHESIA PROCEDURE NOTES
Epidural Block    Patient location during procedure: OB  Start time: 6/27/2022 10:52 PM  End time: 6/27/2022 10:59 PM  Reason for block: labor epidural  Staffing  Performed: anesthesiologist   Anesthesiologist: Agustín Arteaga MD  Epidural  Patient position: sitting  Prep: ChloraPrep  Patient monitoring: continuous pulse ox and frequent blood pressure checks  Approach: right paramedian  Location: L1-2  Injection technique: KATIE air  Provider prep: mask and sterile gloves  Needle  Needle type: Tuohy   Needle gauge: 17 G  Epidural needle length (in): 10 cm. Needle insertion depth: 4 cm  Catheter type: end hole  Catheter size: 19 G  Catheter at skin depth: 9 cm  Test dose: negativeCatheter Secured: tegaderm and tape (liquid adhesive)  Assessment  Hemodynamics: stable  Attempts: 1  Outcomes: patient tolerated procedure well and uncomplicated  Additional Notes  3 cc 1% lidocaine local at needle insertion site. Test dose was negative for intrathecal or venous placement, but surrounding the test dose patient had a vagal reaction that we treated with ephedrine.     Preanesthetic Checklist  Completed: patient identified, IV checked, site marked, risks and benefits discussed, surgical/procedural consents, equipment checked, pre-op evaluation, timeout performed, anesthesia consent given, oxygen available and monitors applied/VS acknowledged

## 2022-06-28 NOTE — LACTATION NOTE
This note was copied from a baby's chart. Mom reports baby has nursed since delivery. Reviewed first 24 hour expectations. Discussed feeding expectations in second day. Encouraged to try at breast, offer both sides and alternate starting side. Encouraged skin to skin. Reviewed Breastfeeding Packet. Plan to visualize feeding prior to discharge. Mom struggled a lot with feeding first baby. Does not plan to overly stress or triple feed with new baby.

## 2022-06-28 NOTE — H&P
[unfilled]  History & Physical    Name: Kira Gonzales MRN: [id number]  SSN: [id number]    YOB: 1990  Age: 27 y.o. Sex: female      Chief Complaint   Patient presents with    Contractions      Subjective:     Estimated Date of Delivery: 22  OB History    Para Term  AB Living   2 1 1 0 0 1   SAB IAB Ectopic Molar Multiple Live Births   0 0 0 0 0 1      # Outcome Date GA Lbr Andrew/2nd Weight Sex Delivery Anes PTL Lv   2 Current            1 Term 04/15/20    F Vag-Spont   AYDE       Ms. Ernesto New is seen with pregnancy at 38w0d for labor admission. contractions on and off since last night, today with some pink discharge. .   Prenatal course was normal.  the patients states that the baby moves as usual  Please see prenatal records for additional details. Past Medical History:   Diagnosis Date    Mononucleosis      Past Surgical History:   Procedure Laterality Date    US BREAST CYST ASPIRATION RIGHT Right 2020    US BREAST CYST ASPIRATION RIGHT 2020 [facility]    WISDOM TOOTH EXTRACTION       Social History     Occupational History    Not on file   Tobacco Use    Smoking status: Never Smoker    Smokeless tobacco: Never Used   Substance and Sexual Activity    Alcohol use: Not Currently    Drug use: No    Sexual activity: Not on file     Family History   Problem Relation Age of Onset    Hypertension Father     No Known Problems Mother     Breast Cancer Maternal Grandmother 64    High Cholesterol Father     Stroke Paternal Grandfather     Diabetes Paternal Grandfather     No Known Problems Paternal Grandmother     Thyroid Disease Sister     No Known Problems Maternal Grandfather        No Known Allergies  Prior to Admission medications    Medication Sig Start Date End Date Taking?  Authorizing Provider   estradiol (ESTRACE) 0.1 MG/GM vaginal cream Place 2 g vaginally daily 20   Ar Automatic Reconciliation fexofenadine-pseudoephedrine (ALLEGRA-D 12HR)  MG per extended release tablet Take 1 tablet by mouth every 12 hours    Ar Automatic Reconciliation   ibuprofen (ADVIL;MOTRIN) 800 MG tablet Take 800 mg by mouth every 6 hours as needed 20   Ar Automatic Reconciliation        Review of Systems:  10 point ROS negative other than noted in HPI      Objective:     Vitals:  Vitals:    22   BP: 127/85   Pulse: (!) 110   Resp: 14   Temp: 98.1 °F (36.7 °C)   TempSrc: Oral   Weight: 200 lb (90.7 kg)   Height: 5' 10\" (1.778 m)        Physical Exam:  Patient without distress. Heart: Regular rate and rhythm  Lung: clear to auscultation throughout lung fields, no wheezes, no rales, no rhonchi and normal respiratory effort  Back: costovertebral angle tenderness absent  Abdomen: soft, nontender, without guarding, without rebound  Fundus: soft and non tender  Cervical Exam: 6 cm dilated    80% effaced    0 station    Presenting Part: cephalic  Lower Extremities:  - Edema 1+  Membranes:  Intact  Fetal Heart Rate tracing: Category 1  Uterine contractions: irregular, every 3-5 minutes    Prenatal Labs:   Patient states gbs negative    Assessment/Plan:     Ms. Opal Weeks is a  seen with pregnancy at 38w0d for active labor.        Plan:     Admit for labor management    Patient discussed with Dr. mckeon    Signed By:  Saba Rodríguez MD     2022

## 2022-06-29 PROCEDURE — 1100000000 HC RM PRIVATE

## 2022-06-29 PROCEDURE — 6370000000 HC RX 637 (ALT 250 FOR IP): Performed by: OBSTETRICS & GYNECOLOGY

## 2022-06-29 RX ORDER — OXYCODONE HYDROCHLORIDE 5 MG/1
5 TABLET ORAL EVERY 4 HOURS PRN
Qty: 30 TABLET | Refills: 0 | Status: SHIPPED | OUTPATIENT
Start: 2022-06-29 | End: 2022-07-02

## 2022-06-29 RX ORDER — IBUPROFEN 800 MG/1
800 TABLET ORAL EVERY 8 HOURS
Qty: 120 TABLET | Refills: 3 | Status: SHIPPED | OUTPATIENT
Start: 2022-06-29

## 2022-06-29 RX ADMIN — DOCUSATE SODIUM 100 MG: 100 CAPSULE ORAL at 10:29

## 2022-06-29 RX ADMIN — IBUPROFEN 800 MG: 800 TABLET ORAL at 22:34

## 2022-06-29 RX ADMIN — IBUPROFEN 800 MG: 800 TABLET ORAL at 14:03

## 2022-06-29 RX ADMIN — DOCUSATE SODIUM 100 MG: 100 CAPSULE ORAL at 22:33

## 2022-06-29 RX ADMIN — IBUPROFEN 800 MG: 800 TABLET ORAL at 06:11

## 2022-06-29 RX ADMIN — ACETAMINOPHEN 1000 MG: 500 TABLET, FILM COATED ORAL at 17:37

## 2022-06-29 RX ADMIN — ACETAMINOPHEN 1000 MG: 500 TABLET, FILM COATED ORAL at 04:24

## 2022-06-29 RX ADMIN — ACETAMINOPHEN 1000 MG: 500 TABLET, FILM COATED ORAL at 10:28

## 2022-06-29 NOTE — PLAN OF CARE
Problem: Pain  Goal: Verbalizes/displays adequate comfort level or baseline comfort level  6/28/2022 2340 by [facility], RN  Outcome: Progressing  6/28/2022 2339 by [facility], RN  Flowsheets (Taken 6/28/2022 2339)  Verbalizes/displays adequate comfort level or baseline comfort level:   Encourage patient to monitor pain and request assistance   Assess pain using appropriate pain scale   Administer analgesics based on type and severity of pain and evaluate response   Implement non-pharmacological measures as appropriate and evaluate response   Consider cultural and social influences on pain and pain management   Notify Licensed Independent Practitioner if interventions unsuccessful or patient reports new pain

## 2022-06-29 NOTE — DISCHARGE SUMMARY
Obstetrical Discharge Summary     Name: Denise Juarez  MRN: [id number]   SSN: Lokesh Gutiérrez     YOB: 1990   Age: 27 y.o. Sex: female       Allergies: No Known Allergies     Admit Date:  2022     Discharge Date:       Admitting Physician: [unfilled]     Attending Physician:  Tim Guallpa MD     * Admission Diagnoses:  Abdominal pain during pregnancy in third trimester [O26.893, R10.9]     * Discharge Diagnoses:     [facility] [[id number]]    [place] Information    Head delivery date/time: 2022 10:15:00   Changing the 's delivery date/time could affect patient care.:    Delivery date/time:  22 1015   Delivery type: Vaginal, Spontaneous    Details: Additional Diagnoses:     Principal Problem:    Abdominal pain during pregnancy in third trimester  Plan:    Immunization History   Administered Date(s) Administered    Influenza, MDCK Quadv, with preserv IM (Flucelvax 2 yrs and older) 2019    Tdap (Boostrix, Adacel) 2020        * Procedures: No admission procedures for hospital encounter. * Discharge Condition: Good    [facility] Course: Normal hospital course following the delivery. * Disposition: Home    Discharge Medications:      Medication List      START taking these medications    oxyCODONE 5 MG immediate release tablet  Commonly known as: ROXICODONE  Take 1 tablet by mouth every 4 hours as needed for Pain for up to 3 days.         CHANGE how you take these medications    ibuprofen 800 MG tablet  Commonly known as: ADVIL;MOTRIN  Take 1 tablet by mouth every 8 hours  What changed:   · when to take this  · reasons to take this        CONTINUE taking these medications    fexofenadine-pseudoephedrine  MG per extended release tablet  Commonly known as: ALLEGRA-D 12HR     sertraline 50 MG tablet  Commonly known as: ZOLOFT        STOP taking these medications    estradiol 0.1 MG/GM vaginal cream  Commonly known as: ESTRACE Where to Get Your Medications      These medications were sent to [address], [address] [place] [id number]    Phone: [phone]   · ibuprofen 800 MG tablet  · oxyCODONE 5 MG immediate release tablet          * Follow-up Care/Patient Instructions:   Activity: activity as tolerated  Diet: regular diet  Wound Care: keep wound clean and dry    Follow-up Information    None

## 2022-06-29 NOTE — ANESTHESIA POSTPROCEDURE EVALUATION
Department of Anesthesiology  Postprocedure Note    Patient: Colleen Cordero  MRN: [id number]  YOB: 1990  Date of evaluation: 6/28/2022      Procedure Summary     Date: 06/27/22 Room / Location:     Anesthesia Start: 2251 Anesthesia Stop: 06/28/22 1015    Procedure: Labor Analgesia Diagnosis:     Scheduled Providers:  Responsible Provider: Jason Rico MD    Anesthesia Type: epidural ASA Status: 1          Anesthesia Type: No value filed.     Julia Phase I:      Julia Phase II:        Anesthesia Post Evaluation    Patient location during evaluation: bedside  Patient participation: complete - patient participated  Level of consciousness: awake and alert  Pain score: 1  Airway patency: patent  Nausea & Vomiting: no vomiting  Complications: no  Cardiovascular status: hemodynamically stable  Respiratory status: acceptable  Hydration status: euvolemic

## 2022-06-29 NOTE — CARE COORDINATION
Chart reviewed - no needs identified. SW met with patient to complete initial assessment. Patient states that she experienced postpartum anxiety after the delivery of her first child in . Onset was 1 year postpartum. Per patient, she reached out to her PCP for medication and also began therapy. Patient remains on Zoloft at this time and plans to continue this medication postpartum. Patient states that the Zoloft manages her anxiety well. Patient given informational packet on  mood & anxiety disorders (resources/education). Family denies any additional needs from  at this time. Family has 's contact information should any needs/questions arise.     MAKENZIE Vasquez, [address] [facility]   [phone]

## 2022-06-30 VITALS
TEMPERATURE: 98.4 F | WEIGHT: 200 LBS | RESPIRATION RATE: 28 BRPM | OXYGEN SATURATION: 97 % | DIASTOLIC BLOOD PRESSURE: 62 MMHG | HEART RATE: 83 BPM | HEIGHT: 70 IN | BODY MASS INDEX: 28.63 KG/M2 | SYSTOLIC BLOOD PRESSURE: 109 MMHG

## 2022-06-30 NOTE — LACTATION NOTE
This note was copied from a baby's chart. In to see mom and infant prior to discharge to home. Experienced mom stated that infant has continued to latch and nurse well. She stated that she has already set up appointment for an outpatient follow up and has no concerns at this time.

## 2022-06-30 NOTE — PLAN OF CARE
Problem: Pain  Goal: Verbalizes/displays adequate comfort level or baseline comfort level  6/30/2022 0056 by Anirudh Barajas RN  Outcome: Completed  6/29/2022 1831 by Lacey Parnell RN  Outcome: Progressing  Flowsheets (Taken 6/29/2022 0900 by Joe Woods RN)  Verbalizes/displays adequate comfort level or baseline comfort level:   Encourage patient to monitor pain and request assistance   Assess pain using appropriate pain scale

## 2022-07-07 ENCOUNTER — HOSPITAL ENCOUNTER (OUTPATIENT)
Dept: LACTATION | Age: 32
Discharge: HOME | End: 2022-07-10

## 2022-07-07 NOTE — LACTATION NOTE
Outpatient Feeding Plan for Breastfeeding  [phone]  Patient: Eliecer Love \"Kesha\"  Gestational Age: 38w 1d  Diagnosis:  V 24.1/Z39.1 Reassurance milk transfer. Joyce GARCIA  Start Time:  0900  Stop Time:  1000    Good, active breastfeeding is when baby is alert, tugging the nipple in long, deep pulls, and you can hear swallows every 1-2 sucks. By now Mom's milk should be in. Brief, light or shallow sucks or short rapid sucks without frequent swallows should not be considered a full breastfeeding. 1. Complete the following mouth exercises prior to feeding:  Gum Massage, Cheek Stretch, Lip Stretch and Non-nutritive Sucking    2. Put the baby to the breast on demand  for 15-20 minutes per side. Finish first side before offering other side. Use: Breast Compression and Breast Massage    3. As needed, after breastfeeding, supplement your baby by bottle with 30 ml/ 1 oz of breast milk/formula: 30ml = 1oz. Position the baby upright to bottle feed. Ensure the nipple is all the way in the baby's mouth and lips are flanged around the bottle. 5. If pumping and bottle feeding, give baby 75-90 ml/2.5-3 oz of breast milk/formula and double pump with massage and compression for 15 minutes. Estimated Needs:  Baby needs 21-23 oz of breast milk/formula per day based on 8 feedings per day. Baby needs 75-90 ml/2.5-3 oz of breast milk/formula per feeding. The more often baby eats the less volume they need per feeding. Date Weight Comments   6-28-22 8-11 Birthweight   6-30-22 7-15 Discharge Weight   7-2-22 7-7 At [facility]   7-5-22 7-10 At [facility]   7[facility]11.6 [facility]      Average weight gain for the first 3 months is 1oz per day. Minimum weight gain in the first 3 months is 0.5 oz per day. Typically regaining to birth weight by 2 weeks. Date Side Position Time Before Wt.  After Wt Total   7-7-22 L Cross cradle  5105 7 min   9 min 3522 3534  3546 12 ml   12 ml   Total: 24 ml     R   0930  3572 26 ml   Total: 50 ml      Baby last ate at 0650. Nursed R 21 and L10. Last pumped at 12a. Pump and bottle for 1 night feeding. Takes 1 oz extra as indicated about 2-4 times per day. Nursing all but 1 feeding. If sleepy may go back to breast in 1 hour. L has 2x volume. Janeen Lea was alert and ready to nurse. Noted tongue slightly protrudes at rest.  Discussed asymmetrical latch. [place] Loydyanagi easily, limited pain per mom. Does need manual lip flange. Janeen Lea nursed fairly well and transferred just under 2 oz. He will likely need to take additional milk after most feeds for now. Mom has her feeding plan sorted and will either nurse or pump at each feeding, but not both. Did discuss 1x per day to pump after a nursing session to get info on supply and efficiency at breast, but mom is happy with current regimen. Noted significant sibling history with torticollis and feeding issues. Suggested closely following efficiency with both breast and bottle. Mom is content with needing to supplement after nursing. Per mom he takes 2 oz by bottle in about 1 hour. Baby's    Oral Digital Assessment:  Long tongue. Compressive with pull. Tongue over gum. Lip is fairly flexible, but does curl under. Short, thin labial frenulum. Slight tongue protrusion at rest.  Sibling sister (2y) had significant torticollis, helmet and OT. Graduated from OT at 1 year. Graduated from PT after 1 year from 1-2 years. Eats well and talks with no problem. 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 Output:  Soaking wets. Yellow, runny, seedy, frequent stools. Mom's    Nipples:  Larger, well everted. Not sore. Breasts:  Large. Not engorged. R history abscess at 6w. Plan:  Continue with on current feeding plan. Mom wants to nurse often and will offer additional milk as indicated. Plans to pump and bottle x1 feeding each night.   No additional pumping. Discussed monitoring efficiency at both breast and with bottle. Full bottle feeds should take 30 minutes or less. Follow tongue protrusion at rest.    Follow-up: To Ped 7-8-22. Will call 7-15-22. Call as needed before.

## 2022-07-08 NOTE — LACTATION NOTE
2022  Re: Dagmar Blackwood  22)    Dear Dr. Michael Anglin,     I saw Flaquita Corona and his mother Mccoy Brothers in our Lactation office 22. Mom came in for a feed and weigh to check milk transfer. Date Weight Comments   22 8-11 Birthweight   22 7-15 Discharge Weight   22 7-7 At [facility]   22 7-10 At [facility]   22 7-11.6 Naked At [facility] OP Lactation      Estimated Needs:  Baby needs 21-23 oz of breast milk/formula per day based on 8 feedings per day. Baby needs 75-90 ml/2.5-3 oz of breast milk/formula per feeding. The more often baby eats the less volume they need per feeding. Date Side Position Time Before Wt. After Wt Total   22 L Cross cradle  0910 7 min   9 min 3522 3534  3546 12 ml   12 ml   Total: 24 ml     R   0930  3572 26 ml   Total: 50 ml       Mariella Killer was alert and ready to nurse. Noted tongue slightly protrudes at rest.  Discussed asymmetrical latch. Sharlee Kast easily, limited pain per mom. Does need manual lip flange. Flaquita Corona nursed fairly well and transferred just under 2 oz. He will likely need to take additional milk after most feeds for now. Mom has her feeding plan sorted and will either nurse or pump at each feeding, but not both. Did discuss 1x per day to pump after a nursing session to get info on supply and efficiency at breast, but mom is happy with current regimen. Noted significant sibling history with torticollis and feeding issues. Suggested closely following efficiency with both breast and bottle. Mom is content with needing to supplement after nursing. Per mom he takes 2 oz by bottle in about 1 hour. Plan:  Continue with on current feeding plan. Mom wants to nurse often and will offer additional milk as indicated. Plans to pump and bottle x1 feeding each night. No additional pumping. Discussed monitoring efficiency at both breast and with bottle. Full bottle feeds should take 30 minutes or less.   Follow tongue protrusion at rest.     Follow-up: To Ped 7-8-22. Will call 7-15-22. Call as needed before.     Sincerely,      Idris Garibay [address], [address] [place] [address], [address] [place] [address]

## 2023-07-13 LAB
C. TRACHOMATIS, EXTERNAL RESULT: NOT DETECTED
HEP B, EXTERNAL RESULT: NON REACTIVE
HEPATITIS C ANTIBODY, EXTERNAL RESULT: NON REACTIVE
HIV, EXTERNAL RESULT: NON REACTIVE
N. GONORRHOEAE, EXTERNAL RESULT: NOT DETECTED
RPR, EXTERNAL RESULT: NON REACTIVE
T. PALLIDUM (SYPHILIS) ANTIBODY, EXTERNAL RESULT: NON REACTIVE

## 2024-01-11 LAB — GBS, EXTERNAL RESULT: NOT DETECTED

## 2024-01-29 ENCOUNTER — ANESTHESIA EVENT (OUTPATIENT)
Dept: LABOR AND DELIVERY | Age: 34
End: 2024-01-29
Payer: COMMERCIAL

## 2024-01-29 ENCOUNTER — ANESTHESIA (OUTPATIENT)
Dept: LABOR AND DELIVERY | Age: 34
End: 2024-01-29
Payer: COMMERCIAL

## 2024-01-29 ENCOUNTER — HOSPITAL ENCOUNTER (INPATIENT)
Age: 34
LOS: 2 days | Discharge: HOME | End: 2024-01-31
Attending: OBSTETRICS & GYNECOLOGY | Admitting: OBSTETRICS & GYNECOLOGY
Payer: COMMERCIAL

## 2024-01-29 DIAGNOSIS — Z3A.39 39 WEEKS GESTATION OF PREGNANCY: Primary | ICD-10-CM

## 2024-01-29 LAB
ABO + RH BLD: NORMAL
BLOOD GROUP ANTIBODIES SERPL: NORMAL
ERYTHROCYTE [DISTWIDTH] IN BLOOD BY AUTOMATED COUNT: 13.5 % (ref 11.9–14.6)
HCT VFR BLD AUTO: 35.6 % (ref 35.8–46.3)
HGB BLD-MCNC: 12 G/DL (ref 11.7–15.4)
MCH RBC QN AUTO: 30.5 PG (ref 26.1–32.9)
MCHC RBC AUTO-ENTMCNC: 33.7 G/DL (ref 31.4–35)
MCV RBC AUTO: 90.6 FL (ref 82–102)
NRBC # BLD: 0 K/UL (ref 0–0.2)
PLATELET # BLD AUTO: 200 K/UL (ref 150–450)
PMV BLD AUTO: 11.5 FL (ref 9.4–12.3)
RBC # BLD AUTO: 3.93 M/UL (ref 4.05–5.2)
SPECIMEN EXP DATE BLD: NORMAL
WBC # BLD AUTO: 8.5 K/UL (ref 4.3–11.1)

## 2024-01-29 PROCEDURE — 7100000010 HC PHASE II RECOVERY - FIRST 15 MIN

## 2024-01-29 PROCEDURE — 86900 BLOOD TYPING SEROLOGIC ABO: CPT

## 2024-01-29 PROCEDURE — 1100000000 HC RM PRIVATE

## 2024-01-29 PROCEDURE — 86901 BLOOD TYPING SEROLOGIC RH(D): CPT

## 2024-01-29 PROCEDURE — 2580000003 HC RX 258: Performed by: OBSTETRICS & GYNECOLOGY

## 2024-01-29 PROCEDURE — 2500000003 HC RX 250 WO HCPCS: Performed by: NURSE ANESTHETIST, CERTIFIED REGISTERED

## 2024-01-29 PROCEDURE — 00HU33Z INSERTION OF INFUSION DEVICE INTO SPINAL CANAL, PERCUTANEOUS APPROACH: ICD-10-PCS | Performed by: ANESTHESIOLOGY

## 2024-01-29 PROCEDURE — 7220000101 HC DELIVERY VAGINAL/SINGLE

## 2024-01-29 PROCEDURE — 10907ZC DRAINAGE OF AMNIOTIC FLUID, THERAPEUTIC FROM PRODUCTS OF CONCEPTION, VIA NATURAL OR ARTIFICIAL OPENING: ICD-10-PCS | Performed by: OBSTETRICS & GYNECOLOGY

## 2024-01-29 PROCEDURE — 3700000025 EPIDURAL BLOCK: Performed by: ANESTHESIOLOGY

## 2024-01-29 PROCEDURE — 85027 COMPLETE CBC AUTOMATED: CPT

## 2024-01-29 PROCEDURE — 6360000002 HC RX W HCPCS: Performed by: OBSTETRICS & GYNECOLOGY

## 2024-01-29 PROCEDURE — 7210000100 HC LABOR FEE PER 1 HR

## 2024-01-29 PROCEDURE — 6360000002 HC RX W HCPCS: Performed by: NURSE ANESTHETIST, CERTIFIED REGISTERED

## 2024-01-29 PROCEDURE — 6370000000 HC RX 637 (ALT 250 FOR IP): Performed by: OBSTETRICS & GYNECOLOGY

## 2024-01-29 PROCEDURE — 7100000011 HC PHASE II RECOVERY - ADDTL 15 MIN

## 2024-01-29 PROCEDURE — 86850 RBC ANTIBODY SCREEN: CPT

## 2024-01-29 PROCEDURE — 6360000002 HC RX W HCPCS: Performed by: ANESTHESIOLOGY

## 2024-01-29 RX ORDER — SODIUM CHLORIDE, SODIUM LACTATE, POTASSIUM CHLORIDE, CALCIUM CHLORIDE 600; 310; 30; 20 MG/100ML; MG/100ML; MG/100ML; MG/100ML
INJECTION, SOLUTION INTRAVENOUS CONTINUOUS
Status: DISCONTINUED | OUTPATIENT
Start: 2024-01-29 | End: 2024-01-31 | Stop reason: HOSPADM

## 2024-01-29 RX ORDER — GLYCOPYRROLATE 0.2 MG/ML
INJECTION INTRAMUSCULAR; INTRAVENOUS PRN
Status: DISCONTINUED | OUTPATIENT
Start: 2024-01-29 | End: 2024-01-29 | Stop reason: SDUPTHER

## 2024-01-29 RX ORDER — SODIUM CHLORIDE 0.9 % (FLUSH) 0.9 %
5-40 SYRINGE (ML) INJECTION PRN
Status: DISCONTINUED | OUTPATIENT
Start: 2024-01-29 | End: 2024-01-31 | Stop reason: HOSPADM

## 2024-01-29 RX ORDER — ACETAMINOPHEN 500 MG
1000 TABLET ORAL EVERY 6 HOURS
Status: DISCONTINUED | OUTPATIENT
Start: 2024-01-29 | End: 2024-01-31 | Stop reason: HOSPADM

## 2024-01-29 RX ORDER — SODIUM CHLORIDE 0.9 % (FLUSH) 0.9 %
5-40 SYRINGE (ML) INJECTION EVERY 12 HOURS SCHEDULED
Status: DISCONTINUED | OUTPATIENT
Start: 2024-01-29 | End: 2024-01-31 | Stop reason: HOSPADM

## 2024-01-29 RX ORDER — METHYLERGONOVINE MALEATE 0.2 MG/ML
200 INJECTION INTRAVENOUS PRN
Status: DISCONTINUED | OUTPATIENT
Start: 2024-01-29 | End: 2024-01-29

## 2024-01-29 RX ORDER — SODIUM CHLORIDE, SODIUM LACTATE, POTASSIUM CHLORIDE, AND CALCIUM CHLORIDE .6; .31; .03; .02 G/100ML; G/100ML; G/100ML; G/100ML
500 INJECTION, SOLUTION INTRAVENOUS PRN
Status: DISCONTINUED | OUTPATIENT
Start: 2024-01-29 | End: 2024-01-29

## 2024-01-29 RX ORDER — FENTANYL CITRATE 50 UG/ML
INJECTION, SOLUTION INTRAMUSCULAR; INTRAVENOUS PRN
Status: DISCONTINUED | OUTPATIENT
Start: 2024-01-29 | End: 2024-01-29 | Stop reason: SDUPTHER

## 2024-01-29 RX ORDER — TRANEXAMIC ACID 10 MG/ML
1000 INJECTION, SOLUTION INTRAVENOUS
Status: DISCONTINUED | OUTPATIENT
Start: 2024-01-29 | End: 2024-01-29

## 2024-01-29 RX ORDER — SODIUM CHLORIDE 9 MG/ML
INJECTION, SOLUTION INTRAVENOUS PRN
Status: DISCONTINUED | OUTPATIENT
Start: 2024-01-29 | End: 2024-01-31 | Stop reason: HOSPADM

## 2024-01-29 RX ORDER — LANOLIN
CREAM (ML) TOPICAL PRN
Status: DISCONTINUED | OUTPATIENT
Start: 2024-01-29 | End: 2024-01-31 | Stop reason: HOSPADM

## 2024-01-29 RX ORDER — ONDANSETRON 2 MG/ML
4 INJECTION INTRAMUSCULAR; INTRAVENOUS EVERY 6 HOURS PRN
Status: DISCONTINUED | OUTPATIENT
Start: 2024-01-29 | End: 2024-01-31 | Stop reason: HOSPADM

## 2024-01-29 RX ORDER — SODIUM CHLORIDE 0.9 % (FLUSH) 0.9 %
5-40 SYRINGE (ML) INJECTION EVERY 12 HOURS SCHEDULED
Status: DISCONTINUED | OUTPATIENT
Start: 2024-01-29 | End: 2024-01-29

## 2024-01-29 RX ORDER — SODIUM CHLORIDE 9 MG/ML
INJECTION, SOLUTION INTRAVENOUS PRN
Status: DISCONTINUED | OUTPATIENT
Start: 2024-01-29 | End: 2024-01-29

## 2024-01-29 RX ORDER — SODIUM CHLORIDE, SODIUM LACTATE, POTASSIUM CHLORIDE, AND CALCIUM CHLORIDE .6; .31; .03; .02 G/100ML; G/100ML; G/100ML; G/100ML
1000 INJECTION, SOLUTION INTRAVENOUS PRN
Status: DISCONTINUED | OUTPATIENT
Start: 2024-01-29 | End: 2024-01-29

## 2024-01-29 RX ORDER — SODIUM CHLORIDE 0.9 % (FLUSH) 0.9 %
5-40 SYRINGE (ML) INJECTION PRN
Status: DISCONTINUED | OUTPATIENT
Start: 2024-01-29 | End: 2024-01-29

## 2024-01-29 RX ORDER — ONDANSETRON 8 MG/1
8 TABLET, ORALLY DISINTEGRATING ORAL EVERY 8 HOURS PRN
Status: DISCONTINUED | OUTPATIENT
Start: 2024-01-29 | End: 2024-01-31 | Stop reason: HOSPADM

## 2024-01-29 RX ORDER — EPHEDRINE SULFATE/0.9% NACL/PF 50 MG/5 ML
SYRINGE (ML) INTRAVENOUS PRN
Status: DISCONTINUED | OUTPATIENT
Start: 2024-01-29 | End: 2024-01-29 | Stop reason: SDUPTHER

## 2024-01-29 RX ORDER — FENTANYL CITRATE 50 UG/ML
INJECTION, SOLUTION INTRAMUSCULAR; INTRAVENOUS
Status: DISCONTINUED
Start: 2024-01-29 | End: 2024-01-29

## 2024-01-29 RX ORDER — POLYETHYLENE GLYCOL 3350 17 G/17G
17 POWDER, FOR SOLUTION ORAL DAILY
Status: DISCONTINUED | OUTPATIENT
Start: 2024-01-29 | End: 2024-01-31 | Stop reason: HOSPADM

## 2024-01-29 RX ORDER — DOCUSATE SODIUM 100 MG/1
100 CAPSULE, LIQUID FILLED ORAL 2 TIMES DAILY
Status: DISCONTINUED | OUTPATIENT
Start: 2024-01-29 | End: 2024-01-29

## 2024-01-29 RX ORDER — MISOPROSTOL 100 UG/1
50 TABLET ORAL
Status: DISCONTINUED | OUTPATIENT
Start: 2024-01-29 | End: 2024-01-29

## 2024-01-29 RX ORDER — OXYCODONE HYDROCHLORIDE 5 MG/1
5 TABLET ORAL EVERY 4 HOURS PRN
Status: DISCONTINUED | OUTPATIENT
Start: 2024-01-29 | End: 2024-01-31 | Stop reason: HOSPADM

## 2024-01-29 RX ORDER — CARBOPROST TROMETHAMINE 250 UG/ML
250 INJECTION, SOLUTION INTRAMUSCULAR PRN
Status: DISCONTINUED | OUTPATIENT
Start: 2024-01-29 | End: 2024-01-29

## 2024-01-29 RX ORDER — ROPIVACAINE HYDROCHLORIDE 2 MG/ML
INJECTION, SOLUTION EPIDURAL; INFILTRATION; PERINEURAL PRN
Status: DISCONTINUED | OUTPATIENT
Start: 2024-01-29 | End: 2024-01-29 | Stop reason: SDUPTHER

## 2024-01-29 RX ORDER — FAMOTIDINE 20 MG/1
20 TABLET, FILM COATED ORAL 2 TIMES DAILY
Status: DISCONTINUED | OUTPATIENT
Start: 2024-01-29 | End: 2024-01-31 | Stop reason: HOSPADM

## 2024-01-29 RX ORDER — IBUPROFEN 800 MG/1
800 TABLET ORAL EVERY 8 HOURS SCHEDULED
Status: DISCONTINUED | OUTPATIENT
Start: 2024-01-29 | End: 2024-01-31 | Stop reason: HOSPADM

## 2024-01-29 RX ORDER — MISOPROSTOL 200 UG/1
800 TABLET ORAL PRN
Status: DISCONTINUED | OUTPATIENT
Start: 2024-01-29 | End: 2024-01-29

## 2024-01-29 RX ORDER — SODIUM CHLORIDE, SODIUM LACTATE, POTASSIUM CHLORIDE, CALCIUM CHLORIDE 600; 310; 30; 20 MG/100ML; MG/100ML; MG/100ML; MG/100ML
INJECTION, SOLUTION INTRAVENOUS CONTINUOUS
Status: DISCONTINUED | OUTPATIENT
Start: 2024-01-29 | End: 2024-01-29

## 2024-01-29 RX ORDER — DOCUSATE SODIUM 100 MG/1
100 CAPSULE, LIQUID FILLED ORAL 2 TIMES DAILY PRN
Status: DISCONTINUED | OUTPATIENT
Start: 2024-01-29 | End: 2024-01-31 | Stop reason: HOSPADM

## 2024-01-29 RX ORDER — SIMETHICONE 80 MG
80 TABLET,CHEWABLE ORAL EVERY 6 HOURS PRN
Status: DISCONTINUED | OUTPATIENT
Start: 2024-01-29 | End: 2024-01-31 | Stop reason: HOSPADM

## 2024-01-29 RX ORDER — MAGNESIUM CARB/ALUMINUM HYDROX 105-160MG
360 TABLET,CHEWABLE ORAL
Status: COMPLETED | OUTPATIENT
Start: 2024-01-29 | End: 2024-01-29

## 2024-01-29 RX ORDER — OXYCODONE HYDROCHLORIDE 5 MG/1
10 TABLET ORAL EVERY 4 HOURS PRN
Status: DISCONTINUED | OUTPATIENT
Start: 2024-01-29 | End: 2024-01-31 | Stop reason: HOSPADM

## 2024-01-29 RX ORDER — ONDANSETRON 2 MG/ML
4 INJECTION INTRAMUSCULAR; INTRAVENOUS EVERY 6 HOURS PRN
Status: DISCONTINUED | OUTPATIENT
Start: 2024-01-29 | End: 2024-01-29

## 2024-01-29 RX ADMIN — Medication 10 MG: at 09:55

## 2024-01-29 RX ADMIN — DOCUSATE SODIUM 100 MG: 100 CAPSULE, LIQUID FILLED ORAL at 23:15

## 2024-01-29 RX ADMIN — ROPIVACAINE HYDROCHLORIDE 8 ML: 2 INJECTION, SOLUTION EPIDURAL; INFILTRATION; PERINEURAL at 09:58

## 2024-01-29 RX ADMIN — SODIUM CHLORIDE, POTASSIUM CHLORIDE, SODIUM LACTATE AND CALCIUM CHLORIDE: 600; 310; 30; 20 INJECTION, SOLUTION INTRAVENOUS at 09:14

## 2024-01-29 RX ADMIN — IBUPROFEN 800 MG: 800 TABLET ORAL at 23:16

## 2024-01-29 RX ADMIN — Medication 240 ML: at 11:04

## 2024-01-29 RX ADMIN — IBUPROFEN 800 MG: 800 TABLET ORAL at 14:02

## 2024-01-29 RX ADMIN — SODIUM CHLORIDE, POTASSIUM CHLORIDE, SODIUM LACTATE AND CALCIUM CHLORIDE 1000 ML: 600; 310; 30; 20 INJECTION, SOLUTION INTRAVENOUS at 10:21

## 2024-01-29 RX ADMIN — ACETAMINOPHEN 1000 MG: 500 TABLET, FILM COATED ORAL at 17:32

## 2024-01-29 RX ADMIN — GLYCOPYRROLATE 0.2 MG: 0.2 INJECTION INTRAMUSCULAR; INTRAVENOUS at 09:49

## 2024-01-29 RX ADMIN — ROPIVACAINE HYDROCHLORIDE 10 ML/HR: 2 INJECTION, SOLUTION EPIDURAL; INFILTRATION; PERINEURAL at 09:59

## 2024-01-29 RX ADMIN — FENTANYL CITRATE 100 MCG: 50 INJECTION, SOLUTION INTRAMUSCULAR; INTRAVENOUS at 10:30

## 2024-01-29 RX ADMIN — OXYTOCIN 2 MILLI-UNITS/MIN: 10 INJECTION, SOLUTION INTRAMUSCULAR; INTRAVENOUS at 09:17

## 2024-01-29 NOTE — ANESTHESIA POSTPROCEDURE EVALUATION
Department of Anesthesiology  Postprocedure Note    Patient: Johana Kline  MRN: [id number]  YOB: 1990  Date of evaluation: 1/29/2024    Procedure Summary       Date: 01/29/24 Room / Location:     Anesthesia Start: 0942 Anesthesia Stop: 1052    Procedure: Labor Analgesia Diagnosis:     Scheduled Providers:  Responsible Provider: Pino Perkins MD    Anesthesia Type: epidural ASA Status: 1            Anesthesia Type: No value filed.    Julia Phase I:      Julia Phase II:      Anesthesia Post Evaluation    Patient location during evaluation: PACU  Patient participation: complete - patient participated  Level of consciousness: awake and alert  Airway patency: patent  Nausea & Vomiting: no nausea and no vomiting  Cardiovascular status: hemodynamically stable  Respiratory status: acceptable, nonlabored ventilation and spontaneous ventilation  Hydration status: euvolemic  Comments: /79   Pulse 78   Temp 98.2 °F (36.8 °C) (Oral)   Resp 16   LMP 04/27/2023   SpO2 98%   Breastfeeding Unknown     Multimodal analgesia pain management approach  Pain management: adequate and satisfactory to patient    No notable events documented.

## 2024-01-29 NOTE — PROGRESS NOTES
Pt admitted to room 433 for induction of labor. IV started, labs drawn, consents witnessed. Pt oriented to room and POC. All pt questions answered. Denies needs at this time.

## 2024-01-29 NOTE — ANESTHESIA PRE PROCEDURE
Department of Anesthesiology  Preprocedure Note       Name:  Johana Kline   Age:  33 y.o.  :  1990                                          MRN:  [id number]         Date:  2024      Surgeon: * No surgeons listed *    Procedure: * No procedures listed *    Medications prior to admission:   Prior to Admission medications    Medication Sig Start Date End Date Taking? Authorizing Provider   Prenatal MV-Min-Fe Fum-FA-DHA (PRENATAL 1 PO) Take by mouth    ProviderPaulette MD   sertraline (ZOLOFT) 50 MG tablet Take 2 tablets by mouth every morning    Provider, MD Paulette       Current medications:    Current Facility-Administered Medications   Medication Dose Route Frequency Provider Last Rate Last Admin   • lactated ringers IV soln infusion   IntraVENous Continuous Olivia Fiore  mL/hr at 24 New Bag at 24   • lactated ringers bolus bolus 500 mL  500 mL IntraVENous PRN Olivia Fiore MD        Or   • lactated ringers bolus bolus 1,000 mL  1,000 mL IntraVENous PRN Olivia Fiore MD       • sodium chloride flush 0.9 % injection 5-40 mL  5-40 mL IntraVENous 2 times per day Olivia Fiore MD       • sodium chloride flush 0.9 % injection 5-40 mL  5-40 mL IntraVENous PRN Olivia Fiore MD       • 0.9 % sodium chloride infusion   IntraVENous PRN Olivia Fiore MD       • tranexamic acid-NaCl IVPB premix 1,000 mg  1,000 mg IntraVENous Once PRN Olivia Fiore MD       • ondansetron (ZOFRAN) injection 4 mg  4 mg IntraVENous Q6H PRN Olivia Fiore MD       • docusate sodium (COLACE) capsule 100 mg  100 mg Oral BID Olivia Fiore MD       • oxytocin (PITOCIN) 30 units in 500 mL infusion  1-20 scott-units/min IntraVENous Continuous Olivia Fiore MD 2 mL/hr at 24 2 scott-units/min at 24     Facility-Administered Medications Ordered in Other Encounters   Medication Dose Route Frequency Provider Last Rate Last Admin   • glycopyrrolate (ROBINUL) injection

## 2024-01-29 NOTE — H&P
History & Physical    Name: Johana Kline  MRN: [id number]   SSN: [unfilled]     YOB: 1990   Age: 33 y.o.  Sex: female       Subjective:     Estimated Date of Delivery: Estimated Date of Delivery: None noted.     OB History    Para Term  AB Living   3 2 2 0 0 2   SAB IAB Ectopic Molar Multiple Live Births   0 0 0 0 0 2      # Outcome Date GA Lbr Andrew/2nd Weight Sex Delivery Anes PTL Lv   3 Current            2 Term 22 38w1d 12:00 / 00:15 3.93 kg (8 lb 10.6 oz) M Vag-Spont EPI N AYDE      Complications: Meconium at birth   1 Term 04/15/20    F Vag-Spont   AYDE        Johana Kline is admitted with pregnancy at 39w 4 d for induction of labor.    Please see prenatal records for details.    Past Medical History:   Diagnosis Date    Mononucleosis       Social History     Socioeconomic History    Marital status:    Tobacco Use    Smoking status: Never    Smokeless tobacco: Never   Substance and Sexual Activity    Alcohol use: Not Currently    Drug use: No      No Known Allergies     History reviewed. No pertinent family history.    No current facility-administered medications on file prior to encounter.     Current Outpatient Medications on File Prior to Encounter   Medication Sig Dispense Refill    Prenatal MV-Min-Fe Fum-FA-DHA (PRENATAL 1 PO) Take by mouth      sertraline (ZOLOFT) 50 MG tablet Take 2 tablets by mouth every morning           Review of Systems: A comprehensive review of systems was negative except for that written in the History of Present Illness.    Objective:     Vitals:    Patient Vitals for the past 24 hrs:   BP Temp Temp src Pulse Resp SpO2   24 0717 132/87 98.2 °F (36.8 °C) Axillary 86 17 98 %         Physical Exam:  AFVSS  Lungs-CTAB  CV-RRR  ABD-soft, gravid    Membranes:  Intact  Fetal Heart Rate: Reactive          Prenatal Labs:   No results found for: \"LABABO\", \"LABRH\"       Impression/Plan:   <principal problem not specified>        Plan: Admit

## 2024-01-29 NOTE — L&D DELIVERY NOTE
Complications:  none           Lab Results   Component Value Date/Time    ABORH A POSITIVE 01/29/2024 08:44 AM    ABSCRNEXT negative 09/24/2019 12:00 AM    RUBELLAEXT immune 09/24/2019 12:00 AM            Attending Attestation: I was present and scrubbed for the entire procedure.

## 2024-01-29 NOTE — PROGRESS NOTES
EPIDURAL PLACEMENT      Dr Perkins at bedside at 0941.  ZEE Cortez at bedside at 0941    Assisted pt to sitting up on bedside at 0943.    Timeout completed at [id number] with MD, ZEE and myself at bedside.    Test dose given at 0953.  Negative reaction.    Dose given at 0958.    Pt assisted to lying back in left tilt position.    See anesthesia record for details.  See vital sign flow sheet for BP.    Tolerated procedure well.

## 2024-01-30 PROCEDURE — 6370000000 HC RX 637 (ALT 250 FOR IP): Performed by: OBSTETRICS & GYNECOLOGY

## 2024-01-30 PROCEDURE — 1100000000 HC RM PRIVATE

## 2024-01-30 RX ADMIN — FAMOTIDINE 20 MG: 20 TABLET, FILM COATED ORAL at 22:44

## 2024-01-30 RX ADMIN — IBUPROFEN 800 MG: 800 TABLET ORAL at 22:37

## 2024-01-30 RX ADMIN — ACETAMINOPHEN 1000 MG: 500 TABLET, FILM COATED ORAL at 18:49

## 2024-01-30 RX ADMIN — ACETAMINOPHEN 1000 MG: 500 TABLET, FILM COATED ORAL at 12:36

## 2024-01-30 RX ADMIN — IBUPROFEN 800 MG: 800 TABLET ORAL at 14:36

## 2024-01-30 RX ADMIN — ACETAMINOPHEN 1000 MG: 500 TABLET, FILM COATED ORAL at 03:35

## 2024-01-30 RX ADMIN — POLYETHYLENE GLYCOL 3350 17 G: 17 POWDER, FOR SOLUTION ORAL at 07:24

## 2024-01-30 RX ADMIN — IBUPROFEN 800 MG: 800 TABLET ORAL at 07:23

## 2024-01-30 NOTE — PROGRESS NOTES
Post Partum day 1   Johana Kline is a 33 y.o. female,       S// Pt reports doing well.  Voiding spontaneously, ambulating independently, lochia diminishing, and pain controlled.  No mood concerns.  Breast feeding.    Vitals:    24 1250 24 1400 24 2312 24 0730   BP: 115/70 114/79 113/78 119/82   Pulse: 84 78 84 82   Resp:  16 17 18   Temp:  98.2 °F (36.8 °C) 97.7 °F (36.5 °C) 98 °F (36.7 °C)   TempSrc:  Oral Oral Oral   SpO2:   96%      Gen- NAD  Lungs- respirations even and un labored  CVS- regular rate, no pallor  Abd- Soft,  Non tender   Fundus- Firm,non tender per RN last check      Lab Results   Component Value Date    WBC 8.5 2024    HGB 12.0 2024    HCT 35.6 (L) 2024    MCV 90.6 2024     2024       A/P: 33 y.o. year old  on PPD#1 s/p TSVD meeting appropriate milestones.  Desires discharge tomorrow.    Plan- Routine postpartum care with likely dc tomorrow pending peds    Claire Parr MD

## 2024-01-30 NOTE — CARE COORDINATION
Chart reviewed - postpartum anxiety.  SW met with patient to complete initial assessment.    Patient confirms a history of postpartum anxiety after the birth of her first child () and \"a little\" after her second child ().  Of note, her first child was born during the COVID epidemic, so this was an added stressor.  Patient states that she took Zoloft throughout this pregnancy, and she plans to remain on this medication postpartum.  Patient denies any depression/anxiety during this pregnancy.     Patient given informational packet on  mood & anxiety disorders (resources/education).    Family denies any additional needs from  at this time.  Family has 's contact information should any needs/questions arise.    RODDY Lainez-ARLENE, PM-C  [facility]   [phone]

## 2024-01-30 NOTE — PLAN OF CARE
Problem: Postpartum  Goal: Experiences normal postpartum course  Description:  Postpartum OB-Pregnancy care plan goal which identifies if the mother is experiencing a normal postpartum course  Outcome: Progressing  Goal: Appropriate maternal -  bonding  Description:  Postpartum OB-Pregnancy care plan goal which identifies if the mother and  are bonding appropriately  Outcome: Progressing  Goal: Establishment of infant feeding pattern  Description:  Postpartum OB-Pregnancy care plan goal which identifies if the mother is establishing a feeding pattern with their   Outcome: Progressing     Problem: Pain  Goal: Verbalizes/displays adequate comfort level or baseline comfort level  Outcome: Progressing     Problem: Infection - Adult  Goal: Absence of infection during hospitalization  Outcome: Progressing     Problem: Safety - Adult  Goal: Free from fall injury  Outcome: Progressing

## 2024-01-30 NOTE — LACTATION NOTE
This note was copied from a baby's chart.  In to follow up with mom and infant. Infant was latched on mom's right breast in the cross cradle hold and sucking rhythmically. Mom stated that infant has been cluster feeding. She stated that at this time she had no concerns. Lactation consultant will follow up tomorrow.  
This note was copied from a baby's chart.  Lactation visit. 3rd time mom, significant feeding struggles with first baby, 2nd baby did slightly better but slow weight gain so pumped x 3 months. This baby only a few hours old but so far latching well. Feeding now on left side. Adjusted mom's positioning slightly and encouraged her to bring baby to the breast and not come down to baby on pillow. Everted nipples. Baby latched well and staying on well. Fed well x 12 minute left side. Switched to right side, cross cradle hold, mom with good technique. Baby again latched well. Feeding now on right side. Reviewed feeding expectations for first 24 hours of life. Watch for feeding cues. Feed on demand. Offer both sides if baby willing. Lactation will continue to assist.   
should be delay pacifier use until breastfeeding is well-established, usually about 3 to 4 wk after birth.  Pacifiers are not available on the Mother Infant Unit.  Artificial nipples should also be avoided until breastfeeding is well established.

## 2024-01-31 ENCOUNTER — LACTATION ENCOUNTER (OUTPATIENT)
Dept: MOTHER INFANT UNIT | Age: 34
End: 2024-01-31

## 2024-01-31 VITALS
TEMPERATURE: 97.8 F | OXYGEN SATURATION: 98 % | HEART RATE: 71 BPM | DIASTOLIC BLOOD PRESSURE: 75 MMHG | SYSTOLIC BLOOD PRESSURE: 114 MMHG | RESPIRATION RATE: 18 BRPM

## 2024-01-31 PROCEDURE — G0008 ADMIN INFLUENZA VIRUS VAC: HCPCS | Performed by: OBSTETRICS & GYNECOLOGY

## 2024-01-31 PROCEDURE — 6360000002 HC RX W HCPCS: Performed by: OBSTETRICS & GYNECOLOGY

## 2024-01-31 PROCEDURE — 6370000000 HC RX 637 (ALT 250 FOR IP): Performed by: OBSTETRICS & GYNECOLOGY

## 2024-01-31 PROCEDURE — 90686 IIV4 VACC NO PRSV 0.5 ML IM: CPT | Performed by: OBSTETRICS & GYNECOLOGY

## 2024-01-31 RX ORDER — OXYCODONE HYDROCHLORIDE 5 MG/1
5 TABLET ORAL EVERY 4 HOURS PRN
Qty: 20 TABLET | Refills: 0 | Status: SHIPPED | OUTPATIENT
Start: 2024-01-31 | End: 2024-02-03

## 2024-01-31 RX ORDER — IBUPROFEN 800 MG/1
800 TABLET ORAL EVERY 8 HOURS SCHEDULED
Qty: 120 TABLET | Refills: 3 | Status: SHIPPED | OUTPATIENT
Start: 2024-01-31

## 2024-01-31 RX ADMIN — IBUPROFEN 800 MG: 800 TABLET ORAL at 06:03

## 2024-01-31 RX ADMIN — INFLUENZA VIRUS VACCINE 0.5 ML: 15; 15; 15; 15 SUSPENSION INTRAMUSCULAR at 10:04

## 2024-01-31 RX ADMIN — ACETAMINOPHEN 1000 MG: 500 TABLET, FILM COATED ORAL at 01:36

## 2024-01-31 RX ADMIN — ACETAMINOPHEN 1000 MG: 500 TABLET, FILM COATED ORAL at 08:51

## 2024-01-31 RX ADMIN — POLYETHYLENE GLYCOL 3350 17 G: 17 POWDER, FOR SOLUTION ORAL at 08:52

## 2024-01-31 NOTE — DISCHARGE INSTRUCTIONS
in your pelvic muscles. You can do these exercises while you stand or sit. (If doing these exercises causes pain, stop doing them and talk with your doctor.)  Squeeze your muscles as if you were trying not to pass gas. Or squeeze your muscles as if you were stopping the flow of urine. Your belly, legs, and buttocks shouldn't move.  Hold the squeeze for 3 seconds, then relax for 5 to 10 seconds.  Start with 3 seconds, then add 1 second each week until you are able to squeeze for 10 seconds.  Repeat the exercise 10 times a session. Do 3 to 8 sessions a day.  Find a class for you and your baby that has an exercise time.  If you had a  birth, give yourself a bit more time before you exercise, and be careful.  When should you call for help?  Share this information with your partner, family, or a friend. They can help you watch for warning signs.  Call 911  anytime you think you may need emergency care. For example, call if:    You have thoughts of harming yourself, your baby, or another person.     You passed out (lost consciousness).     You have chest pain, are short of breath, or cough up blood.     You have a seizure.   Where to get help 24 hours a day, 7 days a week   If you or someone you know talks about suicide, self-harm, a mental health crisis, a substance use crisis, or any other kind of emotional distress, get help right away. You can:    Call the Suicide and Crisis Lifeline at 290.     Call 5-358-706-TALK (1-455.824.3118).     Text HOME to 635000 to access the Crisis Text Line.   Consider saving these numbers in your phone.  Go to [employer] for more information or to chat online.  Call your doctor now or seek immediate medical care if:    You have signs of hemorrhage (too much bleeding), such as:  Heavy vaginal bleeding. This means that you are soaking through one or more pads in an hour. Or you pass blood clots bigger than an egg.  Feeling dizzy or lightheaded, or you feel like you may

## 2024-01-31 NOTE — DISCHARGE SUMMARY
Obstetrical Discharge Summary     Name: Johana Kline  MRN: [id number]   SSN: [unfilled]     YOB: 1990   Age: 33 y.o.  Sex: female       Allergies: No Known Allergies     Admit Date:  2024     Discharge Date:       Admitting Physician: [unfilled]     Attending Physician:  Olivia Fiore MD     * Admission Diagnoses:  39 weeks gestation of pregnancy [Z3A.39]     * Discharge Diagnoses:     Eliud, GIRL Johana [[id number]]      Foster Information    Head delivery date/time: 2024 10:52:00   Changing the 's delivery date/time could affect patient care.:      Delivery date/time:  24 1052   Delivery type: Vaginal, Spontaneous    Details:                       * Procedures: No admission procedures for hospital encounter.     * Discharge Condition: Good    * Hospital Course: Normal hospital course following the delivery.    * Disposition: Home    Discharge Medications:      Medication List        START taking these medications      ibuprofen 800 MG tablet  Commonly known as: ADVIL;MOTRIN  Take 1 tablet by mouth every 8 hours     oxyCODONE 5 MG immediate release tablet  Commonly known as: ROXICODONE  Take 1 tablet by mouth every 4 hours as needed for Pain for up to 3 days. Max Daily Amount: 30 mg            CONTINUE taking these medications      PRENATAL 1 PO     sertraline 50 MG tablet  Commonly known as: ZOLOFT               Where to Get Your Medications        These medications were sent to [id number] [address] [facility] [phone] -  [phone]  [address] [place] [id number]      Phone: [phone]   ibuprofen 800 MG tablet  oxyCODONE 5 MG immediate release tablet          * Follow-up Care/Patient Instructions:  Activity: activity as tolerated  Diet: regular diet  Wound Care: keep wound clean and dry    Follow-up Information

## 2024-01-31 NOTE — LACTATION NOTE
This note was copied from a baby's chart.  Observed at breast in football on L.  Baby fed fairly well.  Demonstrated manual lip flange.  Encouraged frequent feeding and watch output.  Mom reports feedings have been going well.  Call as needed.

## 2024-01-31 NOTE — PROGRESS NOTES
Discussed today plan of care with pt. Bleeding precautions given. Discussed today plan of care with pt. Pt to call with needs/concerns. No s/s of distress noted at this time. Pt in bed with call light in reach.

## 2024-01-31 NOTE — PROGRESS NOTES
01/31/24 0850   AVS Reviewed   AVS & discharge instructions reviewed with patient and/or representative? Yes   Reviewed instructions with Patient   Level of Understanding Questions answered;Verbalized understanding

## 2024-01-31 NOTE — PROGRESS NOTES
Patient discharged to home per MD orders.  Discharge instructions reviewed with patient and pt given a copy. Questions encouraged and answered. Patient verbalizes understanding. Patient escorted by [facility] staff to private vehicle. Stable at discharge.  Pt declined w/c for d/c.